# Patient Record
Sex: MALE | Race: WHITE | NOT HISPANIC OR LATINO
[De-identification: names, ages, dates, MRNs, and addresses within clinical notes are randomized per-mention and may not be internally consistent; named-entity substitution may affect disease eponyms.]

---

## 2019-04-30 ENCOUNTER — APPOINTMENT (OUTPATIENT)
Dept: PULMONOLOGY | Facility: CLINIC | Age: 69
End: 2019-04-30
Payer: MEDICARE

## 2019-04-30 VITALS
HEIGHT: 63 IN | OXYGEN SATURATION: 88 % | SYSTOLIC BLOOD PRESSURE: 105 MMHG | WEIGHT: 215 LBS | BODY MASS INDEX: 38.09 KG/M2 | RESPIRATION RATE: 16 BRPM | TEMPERATURE: 98 F | DIASTOLIC BLOOD PRESSURE: 61 MMHG | HEART RATE: 68 BPM

## 2019-04-30 PROCEDURE — 94729 DIFFUSING CAPACITY: CPT

## 2019-04-30 PROCEDURE — 94726 PLETHYSMOGRAPHY LUNG VOLUMES: CPT

## 2019-04-30 PROCEDURE — ZZZZZ: CPT

## 2019-04-30 PROCEDURE — 94060 EVALUATION OF WHEEZING: CPT

## 2019-04-30 PROCEDURE — 99214 OFFICE O/P EST MOD 30 MIN: CPT | Mod: 25

## 2019-04-30 NOTE — REVIEW OF SYSTEMS
[Snoring] : snoring [Dyspnea] : dyspnea [Nonrestorative Sleep] : nonrestorative sleep [Witnessed Apneas] : demonstrated ~M apnea [Awakes With Headache] : awakes with a headache [Awakes With Dry Mouth] : awakes with dry mouth [Negative] : Pulmonary Hypertension

## 2019-04-30 NOTE — REASON FOR VISIT
[Follow-Up - From Hospitalization] : a hospitalization follow-up [COPD] : COPD [Cough] : cough [Shortness of Breath] : shortness of Breath [Sleep Apnea] : sleep apnea [Wheezing] : wheezing [Spouse] : spouse

## 2019-04-30 NOTE — PHYSICAL EXAM
[General Appearance - Well Developed] : well developed [Normal Appearance] : normal appearance [Well Groomed] : well groomed [General Appearance - Well Nourished] : well nourished [Normal Conjunctiva] : the conjunctiva exhibited no abnormalities [General Appearance - In No Acute Distress] : no acute distress [No Deformities] : no deformities [Normal Oropharynx] : normal oropharynx [Eyelids - No Xanthelasma] : the eyelids demonstrated no xanthelasmas [III] : III [Neck Appearance] : the appearance of the neck was normal [Neck Cervical Mass (___cm)] : no neck mass was observed [Jugular Venous Distention Increased] : there was no jugular-venous distention [Heart Sounds] : normal S1 and S2 [Murmurs] : no murmurs present [Scattered Wheezes] : scattered wheezing [Exaggerated Use Of Accessory Muscles For Inspiration] : no accessory muscle use [Bibasilar Rales/Crackles] : bibasilar rales [Decreased Breath Sounds] : decreased breath sounds [Kyphosis] : kyphosis [Bowel Sounds] : normal bowel sounds [Gait - Sufficient For Exercise Testing] : the gait was sufficient for exercise testing [Abdomen Soft] : soft [Nail Clubbing] : no clubbing of the fingernails [Cyanosis, Localized] : no localized cyanosis [2+ Pitting] : 2+  pitting [] : no rash [Oriented To Time, Place, And Person] : oriented to person, place, and time [No Focal Deficits] : no focal deficits [Affect] : the affect was normal [Impaired Insight] : insight and judgment were intact [Mood] : the mood was normal

## 2019-04-30 NOTE — HISTORY OF PRESENT ILLNESS
[FreeTextEntry1] : Patient is a 68-year-old morbidly obese male with past medical history significant for chronic obstructive pulmonary disease, hypertension, hypothyroidism who presents today for followup after being discharged from the hospital. The patient complains of nonrestorative sleep and awakening with morning headaches. He denies fevers chills chest pain weight loss or hemoptysis

## 2019-04-30 NOTE — ASSESSMENT
[FreeTextEntry1] : In summary the patient is a 68-year-old morbidly obese male with past medical history significant for hypertension, hypothyroidism who was recently discharged from the hospital and presents today for followup. Patient's physical exam is significant for decreased air entry bilaterally.\par \par Patient underwent a pulmonary function test which revealed restrictive and obstructive lung disease.\par \par A  home sleep study has been ordered and the patient is instructed to followup in 6 weeks

## 2019-06-11 ENCOUNTER — APPOINTMENT (OUTPATIENT)
Dept: PULMONOLOGY | Facility: CLINIC | Age: 69
End: 2019-06-11
Payer: MEDICARE

## 2019-06-11 VITALS
RESPIRATION RATE: 14 BRPM | TEMPERATURE: 98.3 F | DIASTOLIC BLOOD PRESSURE: 66 MMHG | BODY MASS INDEX: 38.09 KG/M2 | OXYGEN SATURATION: 92 % | SYSTOLIC BLOOD PRESSURE: 109 MMHG | WEIGHT: 215 LBS | HEART RATE: 79 BPM | HEIGHT: 63 IN

## 2019-06-11 PROCEDURE — ZZZZZ: CPT

## 2019-06-11 PROCEDURE — 99214 OFFICE O/P EST MOD 30 MIN: CPT | Mod: 25

## 2019-06-11 PROCEDURE — 94060 EVALUATION OF WHEEZING: CPT

## 2019-06-11 PROCEDURE — 94729 DIFFUSING CAPACITY: CPT

## 2019-06-11 PROCEDURE — 94726 PLETHYSMOGRAPHY LUNG VOLUMES: CPT

## 2019-06-11 NOTE — HISTORY OF PRESENT ILLNESS
[FreeTextEntry1] : Patient is a 68-year-old morbidly obese male with past medical history significant for chronic obstructive pulmonary disease, hypertension, hypothyroidism who presents today for followup . Patient states that he feels better with less cough and shortness of breath\par States that she is wearing her BIPAP for 6 hours a night

## 2019-06-11 NOTE — PHYSICAL EXAM
[General Appearance - Well Developed] : well developed [Normal Appearance] : normal appearance [No Deformities] : no deformities [Well Groomed] : well groomed [General Appearance - Well Nourished] : well nourished [General Appearance - In No Acute Distress] : no acute distress [III] : III [Normal Conjunctiva] : the conjunctiva exhibited no abnormalities [Normal Oropharynx] : normal oropharynx [Eyelids - No Xanthelasma] : the eyelids demonstrated no xanthelasmas [Neck Appearance] : the appearance of the neck was normal [Neck Cervical Mass (___cm)] : no neck mass was observed [Heart Sounds] : normal S1 and S2 [Jugular Venous Distention Increased] : there was no jugular-venous distention [Murmurs] : no murmurs present [Exaggerated Use Of Accessory Muscles For Inspiration] : no accessory muscle use [Decreased Breath Sounds] : decreased breath sounds [Scattered Wheezes] : scattered wheezing [Bibasilar Rales/Crackles] : bibasilar rales [Kyphosis] : kyphosis [Gait - Sufficient For Exercise Testing] : the gait was sufficient for exercise testing [Abdomen Soft] : soft [Bowel Sounds] : normal bowel sounds [Cyanosis, Localized] : no localized cyanosis [Nail Clubbing] : no clubbing of the fingernails [] : no rash [2+ Pitting] : 2+  pitting [No Focal Deficits] : no focal deficits [Impaired Insight] : insight and judgment were intact [Oriented To Time, Place, And Person] : oriented to person, place, and time [Affect] : the affect was normal [Mood] : the mood was normal

## 2019-06-11 NOTE — REVIEW OF SYSTEMS
[Snoring] : snoring [Witnessed Apneas] : demonstrated ~M apnea [Dyspnea] : dyspnea [Nonrestorative Sleep] : nonrestorative sleep [Awakes With Headache] : awakes with a headache [Awakes With Dry Mouth] : awakes with dry mouth [Negative] : Pulmonary Hypertension

## 2019-06-11 NOTE — REASON FOR VISIT
[COPD] : COPD [Follow-Up] : a follow-up visit [Cough] : cough [Shortness of Breath] : shortness of Breath [Wheezing] : wheezing [Sleep Apnea] : sleep apnea [Spouse] : spouse

## 2019-06-11 NOTE — ASSESSMENT
[FreeTextEntry1] : In summary the patient is a 68-year-old morbidly obese male with past medical history significant for hypertension, hypothyroidism who  presents today for followup. Patient's physical exam is significant for decreased air entry bilaterally.\par \par Patient underwent a pulmonary function test which revealed restrictive and obstructive lung disease.\par PAtient is instructed to continue current medications and to follow up in 3 months

## 2019-10-29 ENCOUNTER — NON-APPOINTMENT (OUTPATIENT)
Age: 69
End: 2019-10-29

## 2019-10-29 ENCOUNTER — APPOINTMENT (OUTPATIENT)
Dept: PULMONOLOGY | Facility: CLINIC | Age: 69
End: 2019-10-29
Payer: MEDICARE

## 2019-10-29 VITALS
OXYGEN SATURATION: 93 % | HEIGHT: 63 IN | BODY MASS INDEX: 39.51 KG/M2 | SYSTOLIC BLOOD PRESSURE: 135 MMHG | DIASTOLIC BLOOD PRESSURE: 81 MMHG | HEART RATE: 68 BPM | RESPIRATION RATE: 14 BRPM | WEIGHT: 223 LBS | TEMPERATURE: 98.3 F

## 2019-10-29 PROCEDURE — 94060 EVALUATION OF WHEEZING: CPT

## 2019-10-29 PROCEDURE — 99214 OFFICE O/P EST MOD 30 MIN: CPT | Mod: 25

## 2019-10-31 NOTE — REVIEW OF SYSTEMS
[Dyspnea] : dyspnea [Snoring] : snoring [Witnessed Apneas] : demonstrated ~M apnea [Nonrestorative Sleep] : nonrestorative sleep [Awakes With Headache] : awakes with a headache [Awakes With Dry Mouth] : awakes with dry mouth [Negative] : Pulmonary Hypertension

## 2019-10-31 NOTE — PHYSICAL EXAM
[General Appearance - Well Developed] : well developed [Normal Appearance] : normal appearance [Well Groomed] : well groomed [General Appearance - Well Nourished] : well nourished [No Deformities] : no deformities [General Appearance - In No Acute Distress] : no acute distress [Normal Conjunctiva] : the conjunctiva exhibited no abnormalities [Eyelids - No Xanthelasma] : the eyelids demonstrated no xanthelasmas [Normal Oropharynx] : normal oropharynx [III] : III [Neck Appearance] : the appearance of the neck was normal [Neck Cervical Mass (___cm)] : no neck mass was observed [Jugular Venous Distention Increased] : there was no jugular-venous distention [Heart Sounds] : normal S1 and S2 [Murmurs] : no murmurs present [Exaggerated Use Of Accessory Muscles For Inspiration] : no accessory muscle use [Scattered Wheezes] : scattered wheezing [Decreased Breath Sounds] : decreased breath sounds [Bibasilar Rales/Crackles] : bibasilar rales [Kyphosis] : kyphosis [Bowel Sounds] : normal bowel sounds [Abdomen Soft] : soft [Gait - Sufficient For Exercise Testing] : the gait was sufficient for exercise testing [Nail Clubbing] : no clubbing of the fingernails [Cyanosis, Localized] : no localized cyanosis [2+ Pitting] : 2+  pitting [] : no rash [No Focal Deficits] : no focal deficits [Oriented To Time, Place, And Person] : oriented to person, place, and time [Impaired Insight] : insight and judgment were intact [Affect] : the affect was normal [Mood] : the mood was normal

## 2019-10-31 NOTE — REASON FOR VISIT
[Follow-Up] : a follow-up visit [COPD] : COPD [Shortness of Breath] : shortness of Breath [Sleep Apnea] : sleep apnea [Spouse] : spouse

## 2019-11-07 ENCOUNTER — APPOINTMENT (OUTPATIENT)
Dept: HEART AND VASCULAR | Facility: CLINIC | Age: 69
End: 2019-11-07
Payer: MEDICARE

## 2019-11-07 PROCEDURE — 93306 TTE W/DOPPLER COMPLETE: CPT

## 2019-11-11 NOTE — ASSESSMENT
[FreeTextEntry1] : In summary the patient is a 69-year-old morbidly obese male with past medical history significant for hypertension, hypothyroidism who  presents today for followup. Patient's physical exam is significant for decreased air entry bilaterally.\par \par Patient underwent spirometry which revealed obstructive lung disease.\par Patient advised to consult with cardiology due to HTN and SOB.\par Patient is instructed to continue current medications and to follow up in 3 months

## 2019-11-11 NOTE — HISTORY OF PRESENT ILLNESS
[FreeTextEntry1] : Patient is a 69-year-old morbidly obese male with past medical history significant for chronic obstructive pulmonary disease, hypertension, hypothyroidism who presents today for followup . Patient states that he feels better with less cough and shortness of breath\par States that he is wearing his  BIPAP for 6 hours a night

## 2019-11-19 ENCOUNTER — APPOINTMENT (OUTPATIENT)
Dept: HEART AND VASCULAR | Facility: CLINIC | Age: 69
End: 2019-11-19
Payer: MEDICARE

## 2019-11-19 ENCOUNTER — NON-APPOINTMENT (OUTPATIENT)
Age: 69
End: 2019-11-19

## 2019-11-19 VITALS
OXYGEN SATURATION: 91 % | HEART RATE: 74 BPM | DIASTOLIC BLOOD PRESSURE: 76 MMHG | BODY MASS INDEX: 39.51 KG/M2 | RESPIRATION RATE: 14 BRPM | WEIGHT: 223 LBS | TEMPERATURE: 97.8 F | HEIGHT: 63 IN | SYSTOLIC BLOOD PRESSURE: 119 MMHG

## 2019-11-19 PROCEDURE — 99205 OFFICE O/P NEW HI 60 MIN: CPT

## 2019-11-19 RX ORDER — FUROSEMIDE 40 MG/1
40 TABLET ORAL
Qty: 3 | Refills: 0 | Status: DISCONTINUED | COMMUNITY
Start: 2019-04-03 | End: 2019-11-19

## 2019-11-19 RX ORDER — FUROSEMIDE 20 MG/1
20 TABLET ORAL
Qty: 30 | Refills: 0 | Status: DISCONTINUED | COMMUNITY
Start: 2019-03-01 | End: 2019-11-19

## 2019-11-19 RX ORDER — AMLODIPINE BESYLATE 10 MG/1
10 TABLET ORAL
Qty: 30 | Refills: 0 | Status: DISCONTINUED | COMMUNITY
Start: 2019-04-03 | End: 2019-11-19

## 2019-11-19 NOTE — ASSESSMENT
[FreeTextEntry1] : 70 yo M with PMH COPD on home O2, LUKE on BIPAP, obesity, hypothyroidism, HTN here for first evaluation with this provider. \par \par DYSPNEA ON EXERTION\par -The patient reports a long history of dyspnea on exertion which is likely multifactorial and includes COPD and LUKE.\par -In the setting of  reported episodes of chest pain and rsik factors  will rule out for CAD with a nuclear stress test (the patient has limited exercise tolerance and walks with a cane) \par \par CHEST PAIN\par -As above\par -In the setting of chest pain, risk factors, we'll rule out for CAD with a nuclear stress test\par \par HTN \par -Well controlled\par -Continue amlodipine 5 mg daily\par - reccomnedd healthy of diet and exercise\par -Obtain a labs are from her primary M.D.\par \par HLD\par -Obtain labs from PMD\par \par Followup in 3 weeks to evaluate results of nuclear stress test\par

## 2019-11-19 NOTE — HISTORY OF PRESENT ILLNESS
[FreeTextEntry1] : PMD: Alex Mosley\par \par 70 yo M with PMH COPD on home O2, LUKE on BIPAP, obesity, hypothyroidism, HTN here for first evaluation with this provider. \par The patient reports severe GALLARDO, limiting his exercise tolerance. \par Today he is accompanied by his sister. \par He is able to walk with a cane for about 1 mile at a very slow pace. \par Reports chest pressure described as chest discomfort 3 times week, mostly during exercise and resolving with rest. \par Reports compliance with his medications and BIPAP\par He reports that his symptoms have been present for years but recently are getting more frequent and limiting his exercise tolerance. \par \par \par PMH\par none \par \par ALL\par NKDA\par \par MEDS\par amlodipine 5 mg daily \par tamsulosin\par levothyroxine\par \par FH \par mother with MI\par \par SH\par no smokes, social; etoh, no drugs \par \par EKG 11/19/2019:\par Normal sinus rhythm, within normal limits\par \par Echocardiogram 11/11/2019\par Technically suboptimal study due to the patient's habitus overall left ventricular ejection fraction is 60-65%. \par Mildly enlarged left atrium\par

## 2019-11-19 NOTE — PHYSICAL EXAM
[General Appearance - Well Developed] : well developed [Normal Appearance] : normal appearance [Well Groomed] : well groomed [General Appearance - Well Nourished] : well nourished [No Deformities] : no deformities [General Appearance - In No Acute Distress] : no acute distress [Normal Jugular Venous A Waves Present] : normal jugular venous A waves present [Normal Jugular Venous V Waves Present] : normal jugular venous V waves present [No Jugular Venous Orellana A Waves] : no jugular venous orellana A waves [Heart Rate And Rhythm] : heart rate and rhythm were normal [Heart Sounds] : normal S1 and S2 [Murmurs] : no murmurs present [Respiration, Rhythm And Depth] : normal respiratory rhythm and effort [Exaggerated Use Of Accessory Muscles For Inspiration] : no accessory muscle use [Auscultation Breath Sounds / Voice Sounds] : lungs were clear to auscultation bilaterally [Abdomen Soft] : soft [Abdomen Tenderness] : non-tender [Abdomen Mass (___ Cm)] : no abdominal mass palpated [Nail Clubbing] : no clubbing of the fingernails [Cyanosis, Localized] : no localized cyanosis [Petechial Hemorrhages (___cm)] : no petechial hemorrhages [] : no rash [Skin Color & Pigmentation] : normal skin color and pigmentation [No Venous Stasis] : no venous stasis [Skin Lesions] : no skin lesions [No Skin Ulcers] : no skin ulcer [No Xanthoma] : no  xanthoma was observed [Oriented To Time, Place, And Person] : oriented to person, place, and time [Affect] : the affect was normal [No Anxiety] : not feeling anxious [Mood] : the mood was normal [FreeTextEntry1] : obese

## 2019-12-13 ENCOUNTER — OUTPATIENT (OUTPATIENT)
Dept: OUTPATIENT SERVICES | Facility: HOSPITAL | Age: 69
LOS: 1 days | End: 2019-12-13
Payer: COMMERCIAL

## 2019-12-13 DIAGNOSIS — I20.9 ANGINA PECTORIS, UNSPECIFIED: ICD-10-CM

## 2019-12-13 PROCEDURE — A9505: CPT

## 2019-12-13 PROCEDURE — 93018 CV STRESS TEST I&R ONLY: CPT

## 2019-12-13 PROCEDURE — 93016 CV STRESS TEST SUPVJ ONLY: CPT

## 2019-12-13 PROCEDURE — 93017 CV STRESS TEST TRACING ONLY: CPT

## 2019-12-13 PROCEDURE — A9500: CPT

## 2019-12-13 PROCEDURE — 78452 HT MUSCLE IMAGE SPECT MULT: CPT

## 2019-12-13 PROCEDURE — 78452 HT MUSCLE IMAGE SPECT MULT: CPT | Mod: 26

## 2019-12-19 ENCOUNTER — APPOINTMENT (OUTPATIENT)
Dept: HEART AND VASCULAR | Facility: CLINIC | Age: 69
End: 2019-12-19
Payer: MEDICARE

## 2019-12-19 VITALS
SYSTOLIC BLOOD PRESSURE: 127 MMHG | HEART RATE: 71 BPM | HEIGHT: 63 IN | OXYGEN SATURATION: 92 % | WEIGHT: 228 LBS | DIASTOLIC BLOOD PRESSURE: 79 MMHG | BODY MASS INDEX: 40.4 KG/M2 | RESPIRATION RATE: 12 BRPM

## 2019-12-19 PROCEDURE — 99215 OFFICE O/P EST HI 40 MIN: CPT

## 2019-12-19 NOTE — HISTORY OF PRESENT ILLNESS
[FreeTextEntry1] : PMD: Alex Mosley\par \par 68 yo M with PMH COPD on home O2, LUKE on BIPAP, obesity, hypothyroidism, HTN here a f/u. \par The patient reports severe GALLARDO, limiting his exercise tolerance. \par Today he is accompanied by his sister. \par He is able to walk with a cane for about 1 mile at a very slow pace. \par Reports chest pressure described as chest discomfort a few times , mostly during exercise and resolving with rest. \par Reports compliance with his medications and BIPAP\par He reports that his symptoms have been present for years but recently are getting more frequent and limiting his exercise tolerance. \par \par \par PMH\par none \par \par ALL\par NKDA\par \par MEDS\par amlodipine 5 mg daily \par tamsulosin\par levothyroxine\par \par FH \par mother with MI\par \par SH\par no smokes, social; etoh, no drugs \par \par EKG 11/19/2019:\par Normal sinus rhythm, within normal limits\par \par Echocardiogram 11/11/2019\par Technically suboptimal study due to the patient's habitus overall left ventricular ejection fraction is 60-65%. \par Mildly enlarged left atrium\par \par Nuclear Stress 12/13/2019\par Normal myocardial perfusion\par Left ventricular function is normal  with normal wall motion\par Normal EKG stress\par

## 2019-12-19 NOTE — PHYSICAL EXAM
[General Appearance - Well Developed] : well developed [Normal Appearance] : normal appearance [Well Groomed] : well groomed [General Appearance - Well Nourished] : well nourished [No Deformities] : no deformities [General Appearance - In No Acute Distress] : no acute distress [Normal Jugular Venous A Waves Present] : normal jugular venous A waves present [Normal Jugular Venous V Waves Present] : normal jugular venous V waves present [No Jugular Venous Orellana A Waves] : no jugular venous orellana A waves [Exaggerated Use Of Accessory Muscles For Inspiration] : no accessory muscle use [Heart Rate And Rhythm] : heart rate and rhythm were normal [Heart Sounds] : normal S1 and S2 [Murmurs] : no murmurs present [Abdomen Soft] : soft [Abdomen Tenderness] : non-tender [Abdomen Mass (___ Cm)] : no abdominal mass palpated [Nail Clubbing] : no clubbing of the fingernails [Cyanosis, Localized] : no localized cyanosis [Petechial Hemorrhages (___cm)] : no petechial hemorrhages [Skin Color & Pigmentation] : normal skin color and pigmentation [] : no rash [No Venous Stasis] : no venous stasis [Skin Lesions] : no skin lesions [No Skin Ulcers] : no skin ulcer [No Xanthoma] : no  xanthoma was observed [Oriented To Time, Place, And Person] : oriented to person, place, and time [Affect] : the affect was normal [Mood] : the mood was normal [No Anxiety] : not feeling anxious [FreeTextEntry1] : Scattered rhonchi at b/l bases. Diminished BS in RUL

## 2020-01-14 ENCOUNTER — APPOINTMENT (OUTPATIENT)
Dept: PULMONOLOGY | Facility: CLINIC | Age: 70
End: 2020-01-14

## 2020-01-28 ENCOUNTER — APPOINTMENT (OUTPATIENT)
Dept: PULMONOLOGY | Facility: CLINIC | Age: 70
End: 2020-01-28
Payer: MEDICARE

## 2020-01-28 VITALS
HEART RATE: 70 BPM | WEIGHT: 215 LBS | OXYGEN SATURATION: 86 % | DIASTOLIC BLOOD PRESSURE: 75 MMHG | RESPIRATION RATE: 14 BRPM | SYSTOLIC BLOOD PRESSURE: 125 MMHG | HEIGHT: 63 IN | BODY MASS INDEX: 38.09 KG/M2

## 2020-01-28 PROCEDURE — 99214 OFFICE O/P EST MOD 30 MIN: CPT

## 2020-01-28 NOTE — PHYSICAL EXAM
[No Acute Distress] : no acute distress [Well Nourished] : well nourished [Well Groomed] : well groomed [No Deformities] : no deformities [Well Developed] : well developed [Normal Oropharynx] : normal oropharynx [III] : Mallampati Class: III [Normal Appearance] : normal appearance [Supple] : supple [No Neck Mass] : no neck mass [No JVD] : no jvd [Normal Rate/Rhythm] : normal rate/rhythm [Normal S1, S2] : normal s1, s2 [No Murmurs] : no murmurs [No Resp Distress] : no resp distress [No Acc Muscle Use] : no acc muscle use [Clear to Auscultation Bilaterally] : clear to auscultation bilaterally [No Abnormalities] : no abnormalities [Benign] : benign [Not Tender] : not tender [Normal Gait] : normal gait [No Clubbing] : no clubbing [No Cyanosis] : no cyanosis [No Edema] : no edema [FROM] : FROM [Normal Color/ Pigmentation] : normal color/ pigmentation [No Focal Deficits] : no focal deficits [Oriented x3] : oriented x3 [Normal Affect] : normal affect

## 2020-01-28 NOTE — HISTORY OF PRESENT ILLNESS
[Former] : former [Never] : never [TextBox_4] : Patient is a 69-year-old obese male with past medical history significant for chronic obstructive pulmonary disease, hypertension, hypothyroidism, obstructive sleep apnea currently on CPAP who presents for followup. The patient had a recent stress test. He states that he is compliant with his CPAP at least 4 hours a day

## 2020-01-28 NOTE — ASSESSMENT
[FreeTextEntry1] : Summary the patient is a 69-year-old obese male with past medical history significant for hypertension, hypothyroidism, obstructive sleep apnea currently on CPAP who presents for followup. The patient still complains of shortness of breath and dyspnea on exertion although his physical exam is significant for good air entry bilaterally. A cardiopulmonary stress test has been ordered and the patient is instructed to followup in 2 weeks

## 2020-01-28 NOTE — REASON FOR VISIT
[Follow-Up] : a follow-up visit [Sleep Apnea] : sleep apnea [Shortness of Breath] : shortness of breath [Friend] : friend

## 2020-02-11 ENCOUNTER — APPOINTMENT (OUTPATIENT)
Dept: INTERNAL MEDICINE | Facility: CLINIC | Age: 70
End: 2020-02-11
Payer: MEDICARE

## 2020-02-11 VITALS
BODY MASS INDEX: 38.62 KG/M2 | RESPIRATION RATE: 15 BRPM | WEIGHT: 218 LBS | DIASTOLIC BLOOD PRESSURE: 72 MMHG | TEMPERATURE: 97.3 F | OXYGEN SATURATION: 94 % | HEART RATE: 62 BPM | HEIGHT: 63 IN | SYSTOLIC BLOOD PRESSURE: 122 MMHG

## 2020-02-11 PROCEDURE — 99203 OFFICE O/P NEW LOW 30 MIN: CPT | Mod: 25

## 2020-02-11 PROCEDURE — 36415 COLL VENOUS BLD VENIPUNCTURE: CPT

## 2020-02-12 NOTE — HEALTH RISK ASSESSMENT
[Good] : ~his/her~  mood as  good [No] : In the past 12 months have you used drugs other than those required for medical reasons? No [0] : 1) Little interest or pleasure doing things: Not at all (0) [No falls in past year] : Patient reported no falls in the past year [] : No

## 2020-02-12 NOTE — PHYSICAL EXAM
[No Acute Distress] : no acute distress [Well Nourished] : well nourished [Well Developed] : well developed [Normal Sclera/Conjunctiva] : normal sclera/conjunctiva [Well-Appearing] : well-appearing [Normal Outer Ear/Nose] : the outer ears and nose were normal in appearance [PERRL] : pupils equal round and reactive to light [EOMI] : extraocular movements intact [Normal Oropharynx] : the oropharynx was normal [No JVD] : no jugular venous distention [Supple] : supple [No Lymphadenopathy] : no lymphadenopathy [Thyroid Normal, No Nodules] : the thyroid was normal and there were no nodules present [No Respiratory Distress] : no respiratory distress  [No Accessory Muscle Use] : no accessory muscle use [Clear to Auscultation] : lungs were clear to auscultation bilaterally [Regular Rhythm] : with a regular rhythm [Normal Rate] : normal rate  [No Murmur] : no murmur heard [Normal S1, S2] : normal S1 and S2 [No Carotid Bruits] : no carotid bruits [No Abdominal Bruit] : a ~M bruit was not heard ~T in the abdomen [Pedal Pulses Present] : the pedal pulses are present [No Edema] : there was no peripheral edema [No Varicosities] : no varicosities [No Extremity Clubbing/Cyanosis] : no extremity clubbing/cyanosis [No Palpable Aorta] : no palpable aorta [Non Tender] : non-tender [Non-distended] : non-distended [Soft] : abdomen soft [No HSM] : no HSM [No Masses] : no abdominal mass palpated [Normal Bowel Sounds] : normal bowel sounds [Normal Posterior Cervical Nodes] : no posterior cervical lymphadenopathy [No CVA Tenderness] : no CVA  tenderness [Normal Anterior Cervical Nodes] : no anterior cervical lymphadenopathy [No Spinal Tenderness] : no spinal tenderness [No Joint Swelling] : no joint swelling [Grossly Normal Strength/Tone] : grossly normal strength/tone [No Rash] : no rash [Coordination Grossly Intact] : coordination grossly intact [No Focal Deficits] : no focal deficits [Normal Gait] : normal gait [Deep Tendon Reflexes (DTR)] : deep tendon reflexes were 2+ and symmetric [Normal Affect] : the affect was normal [Normal Insight/Judgement] : insight and judgment were intact [FreeTextEntry1] : deferred

## 2020-02-12 NOTE — END OF VISIT
[FreeTextEntry3] : I, Shelbie Alvarez, personally transcribed these services in the presence of Dr. Saeed Goodman MD. I, Dr. Saeed Goodman MD, personally performed the services described in this documentation as scribed by Shelbie Alvarez in my presence and it is both accurate and complete.\par

## 2020-02-12 NOTE — HISTORY OF PRESENT ILLNESS
[FreeTextEntry1] : physical examination\par  [de-identified] : Eh is a 69 year old male who presents to the office for a physical examination. Patient is feeling well and offers no specific complaints.

## 2020-02-13 ENCOUNTER — APPOINTMENT (OUTPATIENT)
Dept: PULMONOLOGY | Facility: CLINIC | Age: 70
End: 2020-02-13
Payer: MEDICARE

## 2020-02-13 ENCOUNTER — NON-APPOINTMENT (OUTPATIENT)
Age: 70
End: 2020-02-13

## 2020-02-13 PROCEDURE — 94060 EVALUATION OF WHEEZING: CPT | Mod: 26

## 2020-02-14 ENCOUNTER — NON-APPOINTMENT (OUTPATIENT)
Age: 70
End: 2020-02-14

## 2020-02-14 PROCEDURE — 94621 CARDIOPULM EXERCISE TESTING: CPT

## 2020-02-16 LAB
ALBUMIN SERPL ELPH-MCNC: 4.7 G/DL
ALP BLD-CCNC: 82 U/L
ALT SERPL-CCNC: 15 U/L
ANION GAP SERPL CALC-SCNC: 13 MMOL/L
APPEARANCE: CLEAR
AST SERPL-CCNC: 20 U/L
BACTERIA: NEGATIVE
BILIRUB SERPL-MCNC: 0.3 MG/DL
BILIRUBIN URINE: NEGATIVE
BLOOD URINE: NEGATIVE
BUN SERPL-MCNC: 22 MG/DL
CALCIUM SERPL-MCNC: 9.9 MG/DL
CHLORIDE SERPL-SCNC: 103 MMOL/L
CHOLEST SERPL-MCNC: 150 MG/DL
CHOLEST/HDLC SERPL: 3.1 RATIO
CO2 SERPL-SCNC: 27 MMOL/L
COLOR: YELLOW
CREAT SERPL-MCNC: 0.93 MG/DL
GLUCOSE QUALITATIVE U: NEGATIVE
GLUCOSE SERPL-MCNC: 113 MG/DL
HDLC SERPL-MCNC: 49 MG/DL
HYALINE CASTS: 0 /LPF
KETONES URINE: NEGATIVE
LDLC SERPL CALC-MCNC: 78 MG/DL
LEUKOCYTE ESTERASE URINE: NEGATIVE
MICROSCOPIC-UA: NORMAL
NITRITE URINE: NEGATIVE
PH URINE: 5.5
POTASSIUM SERPL-SCNC: 4.7 MMOL/L
PROT SERPL-MCNC: 6.8 G/DL
PROTEIN URINE: NORMAL
PSA SERPL-MCNC: 4.97 NG/ML
RED BLOOD CELLS URINE: 2 /HPF
SODIUM SERPL-SCNC: 143 MMOL/L
SPECIFIC GRAVITY URINE: 1.03
SQUAMOUS EPITHELIAL CELLS: 0 /HPF
T3 SERPL-MCNC: 98 NG/DL
T4 FREE SERPL-MCNC: 1.3 NG/DL
T4 SERPL-MCNC: 7.4 UG/DL
TRIGL SERPL-MCNC: 113 MG/DL
TSH SERPL-ACNC: 2.6 UIU/ML
UROBILINOGEN URINE: NORMAL
WHITE BLOOD CELLS URINE: 1 /HPF

## 2020-02-19 LAB
BASOPHILS # BLD AUTO: 0.06 K/UL
BASOPHILS NFR BLD AUTO: 0.9 %
EOSINOPHIL # BLD AUTO: 0.16 K/UL
EOSINOPHIL NFR BLD AUTO: 2.3 %
HCT VFR BLD CALC: 42.4 %
HGB BLD-MCNC: 13.7 G/DL
IMM GRANULOCYTES NFR BLD AUTO: 0.4 %
LYMPHOCYTES # BLD AUTO: 1.73 K/UL
LYMPHOCYTES NFR BLD AUTO: 24.7 %
MAN DIFF?: NORMAL
MCHC RBC-ENTMCNC: 31.1 PG
MCHC RBC-ENTMCNC: 32.3 GM/DL
MCV RBC AUTO: 96.1 FL
MONOCYTES # BLD AUTO: 0.65 K/UL
MONOCYTES NFR BLD AUTO: 9.3 %
NEUTROPHILS # BLD AUTO: 4.36 K/UL
NEUTROPHILS NFR BLD AUTO: 62.4 %
PLATELET # BLD AUTO: 215 K/UL
RBC # BLD: 4.41 M/UL
RBC # FLD: 12.6 %
WBC # FLD AUTO: 6.99 K/UL

## 2020-04-21 ENCOUNTER — APPOINTMENT (OUTPATIENT)
Dept: PULMONOLOGY | Facility: CLINIC | Age: 70
End: 2020-04-21
Payer: MEDICARE

## 2020-04-21 ENCOUNTER — APPOINTMENT (OUTPATIENT)
Dept: INTERNAL MEDICINE | Facility: CLINIC | Age: 70
End: 2020-04-21
Payer: MEDICARE

## 2020-04-21 VITALS
WEIGHT: 210 LBS | RESPIRATION RATE: 15 BRPM | HEIGHT: 63 IN | OXYGEN SATURATION: 94 % | BODY MASS INDEX: 37.21 KG/M2 | DIASTOLIC BLOOD PRESSURE: 75 MMHG | TEMPERATURE: 97.6 F | HEART RATE: 71 BPM | SYSTOLIC BLOOD PRESSURE: 120 MMHG

## 2020-04-21 PROCEDURE — 99214 OFFICE O/P EST MOD 30 MIN: CPT | Mod: 25

## 2020-04-21 PROCEDURE — 36415 COLL VENOUS BLD VENIPUNCTURE: CPT

## 2020-04-21 PROCEDURE — 99214 OFFICE O/P EST MOD 30 MIN: CPT

## 2020-04-21 NOTE — PHYSICAL EXAM
[No Acute Distress] : no acute distress [Well Nourished] : well nourished [Well Developed] : well developed [Well-Appearing] : well-appearing [Normal Sclera/Conjunctiva] : normal sclera/conjunctiva [PERRL] : pupils equal round and reactive to light [Normal Outer Ear/Nose] : the outer ears and nose were normal in appearance [EOMI] : extraocular movements intact [Normal Oropharynx] : the oropharynx was normal [No Lymphadenopathy] : no lymphadenopathy [No JVD] : no jugular venous distention [Supple] : supple [Thyroid Normal, No Nodules] : the thyroid was normal and there were no nodules present [No Respiratory Distress] : no respiratory distress  [Clear to Auscultation] : lungs were clear to auscultation bilaterally [No Accessory Muscle Use] : no accessory muscle use [Regular Rhythm] : with a regular rhythm [Normal Rate] : normal rate  [Normal S1, S2] : normal S1 and S2 [No Murmur] : no murmur heard [No Carotid Bruits] : no carotid bruits [No Abdominal Bruit] : a ~M bruit was not heard ~T in the abdomen [No Varicosities] : no varicosities [Pedal Pulses Present] : the pedal pulses are present [No Edema] : there was no peripheral edema [No Palpable Aorta] : no palpable aorta [No Extremity Clubbing/Cyanosis] : no extremity clubbing/cyanosis [Soft] : abdomen soft [Non Tender] : non-tender [No Masses] : no abdominal mass palpated [Non-distended] : non-distended [Normal Bowel Sounds] : normal bowel sounds [No HSM] : no HSM [Normal Posterior Cervical Nodes] : no posterior cervical lymphadenopathy [Normal Anterior Cervical Nodes] : no anterior cervical lymphadenopathy [No CVA Tenderness] : no CVA  tenderness [No Spinal Tenderness] : no spinal tenderness [No Joint Swelling] : no joint swelling [Grossly Normal Strength/Tone] : grossly normal strength/tone [No Rash] : no rash [No Focal Deficits] : no focal deficits [Coordination Grossly Intact] : coordination grossly intact [Normal Affect] : the affect was normal [Deep Tendon Reflexes (DTR)] : deep tendon reflexes were 2+ and symmetric [Normal Gait] : normal gait [FreeTextEntry1] : DEFERRED [Normal Insight/Judgement] : insight and judgment were intact

## 2020-04-21 NOTE — HEALTH RISK ASSESSMENT
[No falls in past year] : Patient reported no falls in the past year [No] : In the past 12 months have you used drugs other than those required for medical reasons? No [0] : 1) Little interest or pleasure doing things: Not at all (0) [[id number]] : 0 [Audit-CScore] : 1

## 2020-04-21 NOTE — HISTORY OF PRESENT ILLNESS
[FreeTextEntry1] :  f/ul elevated glucose and PSA [de-identified] : 68 yo wm came to the office for f/u .found to have   mildly elevated glucose and PSA.\par PT STATES THAT WHEN HE URINATES HE DOES NOT FEEL THAT HIS BLADDER IS EMPTYING COMPLETELY

## 2020-04-22 VITALS — SYSTOLIC BLOOD PRESSURE: 120 MMHG | DIASTOLIC BLOOD PRESSURE: 70 MMHG

## 2020-04-22 NOTE — HISTORY OF PRESENT ILLNESS
[Former] : former [Never] : never [TextBox_4] : Patient is a 69-year-old male with past medical history significant for chronic obstructive pulmonary disease, obstructive sleep apnea on CPAP who presents today for followup. Patient states that he is compliant with his CPAP. He denies fevers chest pain weight loss or hemoptysis

## 2020-04-22 NOTE — ASSESSMENT
[FreeTextEntry1] : Summary the patient is a 69-year-old obese male with past medical history significant for hypertension, hypothyroidism, obstructive sleep apnea currently on CPAP who presents for followup. The patient still complains of shortness of breath and dyspnea on exertion although his physical exam is significant for good air entry bilaterally.Prescription renewal performed . Patient instructed to continue current medications and followup in 3 months

## 2020-04-22 NOTE — PHYSICAL EXAM
[No Acute Distress] : no acute distress [Well Nourished] : well nourished [Well Groomed] : well groomed [No Deformities] : no deformities [Normal Appearance] : normal appearance [Normal Oropharynx] : normal oropharynx [Well Developed] : well developed [III] : Mallampati Class: III [No JVD] : no jvd [Supple] : supple [No Neck Mass] : no neck mass [No Murmurs] : no murmurs [Normal S1, S2] : normal s1, s2 [Normal Rate/Rhythm] : normal rate/rhythm [No Acc Muscle Use] : no acc muscle use [No Resp Distress] : no resp distress [Clear to Auscultation Bilaterally] : clear to auscultation bilaterally [No Abnormalities] : no abnormalities [Benign] : benign [Normal Gait] : normal gait [Not Tender] : not tender [No Clubbing] : no clubbing [FROM] : FROM [No Cyanosis] : no cyanosis [No Edema] : no edema [Normal Color/ Pigmentation] : normal color/ pigmentation [No Focal Deficits] : no focal deficits [Oriented x3] : oriented x3 [Normal Affect] : normal affect

## 2020-04-24 LAB
ESTIMATED AVERAGE GLUCOSE: 126 MG/DL
GLUCOSE BS SERPL-MCNC: 89 MG/DL
HBA1C MFR BLD HPLC: 6 %
PSA SERPL-MCNC: 5.65 NG/ML
T3 SERPL-MCNC: 105 NG/DL
T4 FREE SERPL-MCNC: 1.2 NG/DL
T4 SERPL-MCNC: 7.4 UG/DL
TSH SERPL-ACNC: 5.16 UIU/ML

## 2020-04-28 ENCOUNTER — APPOINTMENT (OUTPATIENT)
Dept: INTERNAL MEDICINE | Facility: CLINIC | Age: 70
End: 2020-04-28

## 2020-05-12 ENCOUNTER — APPOINTMENT (OUTPATIENT)
Dept: HEART AND VASCULAR | Facility: CLINIC | Age: 70
End: 2020-05-12

## 2021-02-22 RX ORDER — LEVOFLOXACIN 500 MG/1
500 TABLET, FILM COATED ORAL
Qty: 7 | Refills: 0 | Status: COMPLETED | COMMUNITY
Start: 2019-03-01 | End: 2021-02-22

## 2021-02-22 RX ORDER — PREDNISONE 10 MG/1
10 TABLET ORAL
Qty: 2 | Refills: 0 | Status: COMPLETED | COMMUNITY
Start: 2019-04-03 | End: 2021-02-22

## 2021-02-22 RX ORDER — ALBUTEROL SULFATE 90 UG/1
108 (90 BASE) INHALANT RESPIRATORY (INHALATION)
Qty: 8 | Refills: 0 | Status: COMPLETED | COMMUNITY
Start: 2019-02-22 | End: 2021-02-22

## 2021-02-22 RX ORDER — PANTOPRAZOLE 40 MG/1
40 TABLET, DELAYED RELEASE ORAL
Qty: 90 | Refills: 0 | Status: COMPLETED | COMMUNITY
Start: 2019-04-24 | End: 2021-02-22

## 2021-02-22 RX ORDER — AMLODIPINE BESYLATE 5 MG/1
5 TABLET ORAL
Qty: 30 | Refills: 0 | Status: COMPLETED | COMMUNITY
Start: 2019-04-18 | End: 2021-02-22

## 2021-02-22 RX ORDER — PREDNISONE 20 MG/1
20 TABLET ORAL
Qty: 2 | Refills: 0 | Status: COMPLETED | COMMUNITY
Start: 2019-04-03 | End: 2021-02-22

## 2021-02-22 RX ORDER — IPRATROPIUM BROMIDE AND ALBUTEROL SULFATE 2.5; .5 MG/3ML; MG/3ML
0.5-2.5 (3) SOLUTION RESPIRATORY (INHALATION)
Qty: 90 | Refills: 0 | Status: COMPLETED | COMMUNITY
Start: 2019-04-03 | End: 2021-02-22

## 2021-02-22 RX ORDER — CITALOPRAM HYDROBROMIDE 10 MG/1
10 TABLET, FILM COATED ORAL
Qty: 90 | Refills: 0 | Status: COMPLETED | COMMUNITY
Start: 2019-04-24 | End: 2021-02-22

## 2021-04-29 ENCOUNTER — APPOINTMENT (OUTPATIENT)
Dept: PULMONOLOGY | Facility: CLINIC | Age: 71
End: 2021-04-29
Payer: MEDICARE

## 2021-04-29 VITALS
OXYGEN SATURATION: 95 % | BODY MASS INDEX: 40.22 KG/M2 | TEMPERATURE: 98.4 F | RESPIRATION RATE: 14 BRPM | SYSTOLIC BLOOD PRESSURE: 131 MMHG | WEIGHT: 227 LBS | HEIGHT: 63 IN | HEART RATE: 78 BPM | DIASTOLIC BLOOD PRESSURE: 78 MMHG

## 2021-04-29 PROCEDURE — 94729 DIFFUSING CAPACITY: CPT

## 2021-04-29 PROCEDURE — 99214 OFFICE O/P EST MOD 30 MIN: CPT | Mod: 25

## 2021-04-29 PROCEDURE — 99072 ADDL SUPL MATRL&STAF TM PHE: CPT

## 2021-04-29 PROCEDURE — 94726 PLETHYSMOGRAPHY LUNG VOLUMES: CPT

## 2021-04-29 PROCEDURE — 94060 EVALUATION OF WHEEZING: CPT

## 2021-04-29 PROCEDURE — 95012 NITRIC OXIDE EXP GAS DETER: CPT

## 2021-04-29 NOTE — HISTORY OF PRESENT ILLNESS
[Former] : former [Never] : never [TextBox_4] : Patient is a 70 year old male with past medical history significant for obstructive sleep apnea currently on CPAP, hypertension, hypothyroidism who presents today for follow-up.  The patient has not been seen in over 1 year.  He also states that he is not compliant with his bronchodilator therapy.  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

## 2021-04-29 NOTE — PHYSICAL EXAM
[No Acute Distress] : no acute distress [Well Nourished] : well nourished [Well Groomed] : well groomed [No Deformities] : no deformities [Well Developed] : well developed [Normal Oropharynx] : normal oropharynx [III] : Mallampati Class: III [Normal Appearance] : normal appearance [Supple] : supple [No Neck Mass] : no neck mass [No JVD] : no jvd [Normal Rate/Rhythm] : normal rate/rhythm [Normal S1, S2] : normal s1, s2 [No Murmurs] : no murmurs [No Resp Distress] : no resp distress [No Acc Muscle Use] : no acc muscle use [Wheeze] : wheeze [No Abnormalities] : no abnormalities [Benign] : benign [Not Tender] : not tender [Normal Gait] : normal gait [No Clubbing] : no clubbing [No Cyanosis] : no cyanosis [No Edema] : no edema [FROM] : FROM [Normal Color/ Pigmentation] : normal color/ pigmentation [No Focal Deficits] : no focal deficits [Oriented x3] : oriented x3 [Normal Affect] : normal affect

## 2021-04-29 NOTE — ASSESSMENT
[FreeTextEntry1] : In summary the patient is a 70-year-old obese male with past medical history significant for obstructive sleep apnea currently on CPAP, hypertension, hypothyroidism who presents today for follow-up.\par \par Patient's physical exam is significant for expiratory wheezing bilaterally.\par \par Patient underwent a pulmonary function test which revealed severe obstructive and restrictive lung disease\par FeNO 8 PPB\par Had a lengthy discussion with the patient regarding CPAP compliance.  I will contact his [employer] company to reorder supplies.  Patient is now started on Spiriva and instructed to follow-up in 1 month

## 2021-05-27 ENCOUNTER — APPOINTMENT (OUTPATIENT)
Dept: PULMONOLOGY | Facility: CLINIC | Age: 71
End: 2021-05-27
Payer: MEDICARE

## 2021-05-27 VITALS
WEIGHT: 223 LBS | TEMPERATURE: 98.3 F | OXYGEN SATURATION: 93 % | HEIGHT: 63 IN | RESPIRATION RATE: 16 BRPM | BODY MASS INDEX: 39.51 KG/M2 | HEART RATE: 65 BPM | DIASTOLIC BLOOD PRESSURE: 72 MMHG | SYSTOLIC BLOOD PRESSURE: 118 MMHG

## 2021-05-27 PROCEDURE — 99214 OFFICE O/P EST MOD 30 MIN: CPT

## 2021-05-27 NOTE — ASSESSMENT
[FreeTextEntry1] : In summary the patient is a 70-year-old obese male with past medical history significant for obstructive sleep apnea currently on CPAP, hypertension, hypothyroidism who presents today for follow-up.\par The patient's physical exam is significant for good air entry bilaterally.  Prescription renewal performed.  Patient instructed to continue current therapy and follow-up in 3 months

## 2021-05-27 NOTE — HISTORY OF PRESENT ILLNESS
[Former] : former [Never] : never [TextBox_4] : Patient is a 70-year-old male with past medical history significant for obstructive sleep apnea currently on CPAP, hypothyroidism, hypertension who presents today for follow-up. The patient states that his cough shortness of breath and dyspnea on exertion has improved. He recently reported new supplies for his CPAP machine. He denies fevers chills chest pain weight loss or hemoptysis

## 2021-06-17 ENCOUNTER — RX RENEWAL (OUTPATIENT)
Age: 71
End: 2021-06-17

## 2021-07-08 ENCOUNTER — RX RENEWAL (OUTPATIENT)
Age: 71
End: 2021-07-08

## 2021-08-12 ENCOUNTER — RX RENEWAL (OUTPATIENT)
Age: 71
End: 2021-08-12

## 2021-08-24 ENCOUNTER — APPOINTMENT (OUTPATIENT)
Dept: PULMONOLOGY | Facility: CLINIC | Age: 71
End: 2021-08-24
Payer: MEDICARE

## 2021-08-24 VITALS
DIASTOLIC BLOOD PRESSURE: 73 MMHG | OXYGEN SATURATION: 94 % | RESPIRATION RATE: 16 BRPM | WEIGHT: 209 LBS | SYSTOLIC BLOOD PRESSURE: 123 MMHG | BODY MASS INDEX: 37.03 KG/M2 | HEART RATE: 68 BPM | TEMPERATURE: 98.6 F | HEIGHT: 63 IN

## 2021-08-24 PROCEDURE — 99214 OFFICE O/P EST MOD 30 MIN: CPT

## 2021-08-24 NOTE — HISTORY OF PRESENT ILLNESS
[TextBox_4] : Patient is a 71-year-old male with past medical history significant for obstructive sleep apnea currently on CPAP, hypothyroidism, hypertension, BPH who presents today for follow-up.  Patient states that he is compliant with his CPAP and uses it for minimum of 4 hours/day.  He denies any snoring and nonrestorative sleep.  He currently states he is compliant with his medications and his diet

## 2021-11-09 ENCOUNTER — APPOINTMENT (OUTPATIENT)
Dept: PULMONOLOGY | Facility: CLINIC | Age: 71
End: 2021-11-09
Payer: MEDICARE

## 2021-11-09 ENCOUNTER — NON-APPOINTMENT (OUTPATIENT)
Age: 71
End: 2021-11-09

## 2021-11-09 ENCOUNTER — APPOINTMENT (OUTPATIENT)
Dept: INTERNAL MEDICINE | Facility: CLINIC | Age: 71
End: 2021-11-09
Payer: MEDICARE

## 2021-11-09 VITALS — SYSTOLIC BLOOD PRESSURE: 130 MMHG | DIASTOLIC BLOOD PRESSURE: 80 MMHG

## 2021-11-09 VITALS
RESPIRATION RATE: 14 BRPM | BODY MASS INDEX: 36.5 KG/M2 | SYSTOLIC BLOOD PRESSURE: 128 MMHG | OXYGEN SATURATION: 93 % | TEMPERATURE: 98.6 F | HEART RATE: 75 BPM | WEIGHT: 206 LBS | HEIGHT: 63 IN | DIASTOLIC BLOOD PRESSURE: 77 MMHG

## 2021-11-09 DIAGNOSIS — K42.9 UMBILICAL HERNIA W/OUT OBSTRUCTION OR GANGRENE: ICD-10-CM

## 2021-11-09 PROCEDURE — G0439: CPT

## 2021-11-09 PROCEDURE — 93000 ELECTROCARDIOGRAM COMPLETE: CPT | Mod: 59

## 2021-11-09 PROCEDURE — 99214 OFFICE O/P EST MOD 30 MIN: CPT

## 2021-11-09 PROCEDURE — 36415 COLL VENOUS BLD VENIPUNCTURE: CPT

## 2021-11-09 RX ORDER — FLUTICASONE PROPIONATE AND SALMETEROL 50; 250 UG/1; UG/1
250-50 POWDER RESPIRATORY (INHALATION)
Qty: 1 | Refills: 3 | Status: ACTIVE | COMMUNITY
Start: 2021-05-27 | End: 1900-01-01

## 2021-11-09 NOTE — HISTORY OF PRESENT ILLNESS
[Former] : former [Never] : never [TextBox_4] : Patient is a 71-year-old male with past medical history significant for obstructive sleep apnea currently on CPAP, hypothyroidism, hypertension, BPH who presents today for follow-up.  The patient continues to lose weight with exercise.  He denies fevers chills chest pain weight loss or hemoptysis.  He states that he uses his CPAP a minimum of 4 hours/day on a daily basis

## 2021-11-09 NOTE — ASSESSMENT
[FreeTextEntry1] : In summary the patient is a 71-year-old obese male with past medical history significant for obstructive sleep apnea currently on CPAP, hypertension, hypothyroidism who presents today for follow-up.\par The patient's physical exam is significant for good air entry bilaterally.  Prescription renewal performed.  Patient instructed to continue current therapy and follow-up in 3 months

## 2021-11-13 ENCOUNTER — NON-APPOINTMENT (OUTPATIENT)
Age: 71
End: 2021-11-13

## 2021-11-13 NOTE — HEALTH RISK ASSESSMENT
[Good] : ~his/her~  mood as  good [No] : In the past 12 months have you used drugs other than those required for medical reasons? No [No falls in past year] : Patient reported no falls in the past year [0] : 2) Feeling down, depressed, or hopeless: Not at all (0) [] : No [Audit-CScore] : 1 [de-identified] : sedentary [de-identified] : regular [[id number]] : 0 [ColonoscopyDate] : 01/2019

## 2021-11-13 NOTE — PLAN
[FreeTextEntry1] : Blood tests and urine sent to the lab\par \par EKG\par \par Continue same medications\par \par Follow-up with SIL[facility]ANO

## 2021-11-13 NOTE — END OF VISIT
[FreeTextEntry3] : I, Braulio Walsh, personally transcribed these services in the presence of Dr. Saeed Goodman MD. I, Dr. Saeed Goodman MD, personally performed the services described in this documentation as scribed by Braulio Walsh in my presence and it is both accurate and complete.

## 2021-11-13 NOTE — PHYSICAL EXAM
[No Acute Distress] : no acute distress [Well Nourished] : well nourished [Well Developed] : well developed [Well-Appearing] : well-appearing [Normal Sclera/Conjunctiva] : normal sclera/conjunctiva [PERRL] : pupils equal round and reactive to light [EOMI] : extraocular movements intact [Normal Outer Ear/Nose] : the outer ears and nose were normal in appearance [Normal Oropharynx] : the oropharynx was normal [No JVD] : no jugular venous distention [No Lymphadenopathy] : no lymphadenopathy [Supple] : supple [Thyroid Normal, No Nodules] : the thyroid was normal and there were no nodules present [No Respiratory Distress] : no respiratory distress  [No Accessory Muscle Use] : no accessory muscle use [Clear to Auscultation] : lungs were clear to auscultation bilaterally [Normal Rate] : normal rate  [Regular Rhythm] : with a regular rhythm [Normal S1, S2] : normal S1 and S2 [No Murmur] : no murmur heard [No Carotid Bruits] : no carotid bruits [No Abdominal Bruit] : a ~M bruit was not heard ~T in the abdomen [No Varicosities] : no varicosities [Pedal Pulses Present] : the pedal pulses are present [No Edema] : there was no peripheral edema [No Palpable Aorta] : no palpable aorta [No Extremity Clubbing/Cyanosis] : no extremity clubbing/cyanosis [Soft] : abdomen soft [Non Tender] : non-tender [Non-distended] : non-distended [No Masses] : no abdominal mass palpated [No HSM] : no HSM [Normal Bowel Sounds] : normal bowel sounds [Normal Posterior Cervical Nodes] : no posterior cervical lymphadenopathy [Normal Anterior Cervical Nodes] : no anterior cervical lymphadenopathy [No CVA Tenderness] : no CVA  tenderness [No Spinal Tenderness] : no spinal tenderness [No Joint Swelling] : no joint swelling [Grossly Normal Strength/Tone] : grossly normal strength/tone [No Rash] : no rash [Coordination Grossly Intact] : coordination grossly intact [No Focal Deficits] : no focal deficits [Normal Gait] : normal gait [Deep Tendon Reflexes (DTR)] : deep tendon reflexes were 2+ and symmetric [Normal Affect] : the affect was normal [Normal Insight/Judgement] : insight and judgment were intact [de-identified] : mild obesity [de-identified] : umbilical hernia [FreeTextEntry1] : deferred

## 2021-11-13 NOTE — HISTORY OF PRESENT ILLNESS
[FreeTextEntry1] : Physical examination  [de-identified] : MARA CAMACHO is a 71 year old male with a PMHx of BPH, HTN, hyporthyroidism, angina class III, and elevated glucose who presents today for physical examination. He is feeling okay and offers no specific complaints. \par \par Pt has completed third dose of COVID-19 vaccine series (Moderna) as well as H Influenza vaccine, Pneumovax, and Shringrex within the last 2 months. \par \par He was seen by  1 month ago and work-up is in process.

## 2021-11-28 LAB
25(OH)D3 SERPL-MCNC: 43.7 NG/ML
ALBUMIN SERPL ELPH-MCNC: 4.7 G/DL
ALP BLD-CCNC: 106 U/L
ALT SERPL-CCNC: 14 U/L
ANION GAP SERPL CALC-SCNC: 16 MMOL/L
APPEARANCE: CLEAR
AST SERPL-CCNC: 21 U/L
BACTERIA: NEGATIVE
BILIRUB SERPL-MCNC: 0.4 MG/DL
BILIRUBIN URINE: NEGATIVE
BLOOD URINE: NEGATIVE
BUN SERPL-MCNC: 14 MG/DL
CALCIUM SERPL-MCNC: 9.6 MG/DL
CHLORIDE SERPL-SCNC: 103 MMOL/L
CHOLEST SERPL-MCNC: 179 MG/DL
CO2 SERPL-SCNC: 23 MMOL/L
COLOR: YELLOW
CREAT SERPL-MCNC: 0.87 MG/DL
ESTIMATED AVERAGE GLUCOSE: 120 MG/DL
GLUCOSE QUALITATIVE U: NEGATIVE
GLUCOSE SERPL-MCNC: 83 MG/DL
HBA1C MFR BLD HPLC: 5.8 %
HDLC SERPL-MCNC: 54 MG/DL
HYALINE CASTS: 4 /LPF
KETONES URINE: NEGATIVE
LDLC SERPL CALC-MCNC: 105 MG/DL
LEUKOCYTE ESTERASE URINE: NEGATIVE
MICROSCOPIC-UA: NORMAL
NITRITE URINE: NEGATIVE
NONHDLC SERPL-MCNC: 126 MG/DL
PH URINE: 5.5
POTASSIUM SERPL-SCNC: 4.5 MMOL/L
PROT SERPL-MCNC: 7.4 G/DL
PROTEIN URINE: NORMAL
PSA SERPL-MCNC: 6.94 NG/ML
RED BLOOD CELLS URINE: 2 /HPF
SODIUM SERPL-SCNC: 142 MMOL/L
SPECIFIC GRAVITY URINE: 1.02
SQUAMOUS EPITHELIAL CELLS: 1 /HPF
T3 SERPL-MCNC: 110 NG/DL
T4 FREE SERPL-MCNC: 1.5 NG/DL
T4 SERPL-MCNC: 9.2 UG/DL
TRIGL SERPL-MCNC: 102 MG/DL
TSH SERPL-ACNC: 2.99 UIU/ML
UROBILINOGEN URINE: NORMAL
WHITE BLOOD CELLS URINE: 11 /HPF

## 2021-12-17 LAB
BASOPHILS # BLD AUTO: 0.06 K/UL
BASOPHILS NFR BLD AUTO: 0.7 %
EOSINOPHIL # BLD AUTO: 0.15 K/UL
EOSINOPHIL NFR BLD AUTO: 1.7 %
HCT VFR BLD CALC: 45.2 %
HGB BLD-MCNC: 14.5 G/DL
IMM GRANULOCYTES NFR BLD AUTO: 0.2 %
LYMPHOCYTES # BLD AUTO: 1.31 K/UL
LYMPHOCYTES NFR BLD AUTO: 14.7 %
MAN DIFF?: NORMAL
MCHC RBC-ENTMCNC: 30.3 PG
MCHC RBC-ENTMCNC: 32.1 GM/DL
MCV RBC AUTO: 94.6 FL
MONOCYTES # BLD AUTO: 0.63 K/UL
MONOCYTES NFR BLD AUTO: 7 %
NEUTROPHILS # BLD AUTO: 6.77 K/UL
NEUTROPHILS NFR BLD AUTO: 75.7 %
PLATELET # BLD AUTO: 226 K/UL
RBC # BLD: 4.78 M/UL
RBC # FLD: 13.1 %
WBC # FLD AUTO: 8.94 K/UL

## 2022-01-30 ENCOUNTER — NON-APPOINTMENT (OUTPATIENT)
Age: 72
End: 2022-01-30

## 2022-02-22 ENCOUNTER — APPOINTMENT (OUTPATIENT)
Dept: PULMONOLOGY | Facility: CLINIC | Age: 72
End: 2022-02-22
Payer: MEDICARE

## 2022-02-22 ENCOUNTER — APPOINTMENT (OUTPATIENT)
Dept: INTERNAL MEDICINE | Facility: CLINIC | Age: 72
End: 2022-02-22
Payer: MEDICARE

## 2022-02-22 VITALS
TEMPERATURE: 97.9 F | WEIGHT: 218 LBS | SYSTOLIC BLOOD PRESSURE: 138 MMHG | DIASTOLIC BLOOD PRESSURE: 79 MMHG | HEART RATE: 75 BPM | BODY MASS INDEX: 38.62 KG/M2 | OXYGEN SATURATION: 98 % | HEIGHT: 63 IN

## 2022-02-22 VITALS — DIASTOLIC BLOOD PRESSURE: 80 MMHG | SYSTOLIC BLOOD PRESSURE: 140 MMHG

## 2022-02-22 DIAGNOSIS — R79.9 ABNORMAL FINDING OF BLOOD CHEMISTRY, UNSPECIFIED: ICD-10-CM

## 2022-02-22 PROCEDURE — 99214 OFFICE O/P EST MOD 30 MIN: CPT

## 2022-02-22 PROCEDURE — 36415 COLL VENOUS BLD VENIPUNCTURE: CPT

## 2022-02-22 PROCEDURE — 99214 OFFICE O/P EST MOD 30 MIN: CPT | Mod: 25

## 2022-02-22 NOTE — HISTORY OF PRESENT ILLNESS
[Never] : never [TextBox_4] : Patient is a 71-year-old obese male with past medical history significant for hypothyroidism hypertension BPH obstructive sleep apnea currently on CPAP who presents for follow-up.  The patient states that he is compliant with his CPAP device.  He does state that he has been noncompliant with his diet and exercise.  He currently denies fevers chills chest pain weight loss or hemoptysis

## 2022-02-23 NOTE — END OF VISIT
[FreeTextEntry3] : I, Brooklynn Parikh, personally transcribed these services in the presence of Dr. Saeed Goodman MD. I, Dr. Saeed Goodman MD, personally performed the services described in this documentation as scribed by Brooklynn Parikh in my presence and it is both accurate and complete.

## 2022-02-23 NOTE — HEALTH RISK ASSESSMENT
[Never] : Never [Yes] : Yes [Monthly or less (1 pt)] : Monthly or less (1 point) [No] : In the past 12 months have you used drugs other than those required for medical reasons? No [No falls in past year] : Patient reported no falls in the past year [0] : 2) Feeling down, depressed, or hopeless: Not at all (0) [Good] : ~his/her~  mood as  good [de-identified] : Occasionally  [Audit-CScore] : 1 [de-identified] : sedentary [de-identified] : regular [[id number]] : 0

## 2022-02-23 NOTE — PHYSICAL EXAM
[No Acute Distress] : no acute distress [Well Nourished] : well nourished [Well Developed] : well developed [Well-Appearing] : well-appearing [Normal Sclera/Conjunctiva] : normal sclera/conjunctiva [PERRL] : pupils equal round and reactive to light [EOMI] : extraocular movements intact [Normal Oropharynx] : the oropharynx was normal [Normal Outer Ear/Nose] : the outer ears and nose were normal in appearance [No JVD] : no jugular venous distention [No Lymphadenopathy] : no lymphadenopathy [Supple] : supple [Thyroid Normal, No Nodules] : the thyroid was normal and there were no nodules present [No Respiratory Distress] : no respiratory distress  [No Accessory Muscle Use] : no accessory muscle use [Clear to Auscultation] : lungs were clear to auscultation bilaterally [Normal Rate] : normal rate  [Regular Rhythm] : with a regular rhythm [Normal S1, S2] : normal S1 and S2 [No Carotid Bruits] : no carotid bruits [No Murmur] : no murmur heard [No Abdominal Bruit] : a ~M bruit was not heard ~T in the abdomen [No Varicosities] : no varicosities [Pedal Pulses Present] : the pedal pulses are present [No Edema] : there was no peripheral edema [No Palpable Aorta] : no palpable aorta [No Extremity Clubbing/Cyanosis] : no extremity clubbing/cyanosis [Soft] : abdomen soft [Non Tender] : non-tender [Non-distended] : non-distended [No Masses] : no abdominal mass palpated [No HSM] : no HSM [Normal Bowel Sounds] : normal bowel sounds [Normal Posterior Cervical Nodes] : no posterior cervical lymphadenopathy [Normal Anterior Cervical Nodes] : no anterior cervical lymphadenopathy [No CVA Tenderness] : no CVA  tenderness [No Spinal Tenderness] : no spinal tenderness [No Joint Swelling] : no joint swelling [Grossly Normal Strength/Tone] : grossly normal strength/tone [No Rash] : no rash [Coordination Grossly Intact] : coordination grossly intact [No Focal Deficits] : no focal deficits [Normal Gait] : normal gait [Deep Tendon Reflexes (DTR)] : deep tendon reflexes were 2+ and symmetric [Normal Affect] : the affect was normal [Normal Insight/Judgement] : insight and judgment were intact [FreeTextEntry1] : deferred [de-identified] : mild obesity

## 2022-02-23 NOTE — HISTORY OF PRESENT ILLNESS
[FreeTextEntry1] : Follow-up \par  [de-identified] : MARA CAMACHO is a 71 year old male with a PMHx of angina class III, BPH, blood glucose elevated, HTN, hypothyroidism, LUKE on CPAP, and umbilical hernia who presents today for follow-up. Pt was found to have elevated PSA and was seen by Urologist and MRI of prostate was done. \par \par He is feeling okay and offers no specific complaints.\par \par Pt has completed the COVID-19 series vaccination with booster (Moderna)\par

## 2022-03-06 LAB
25(OH)D3 SERPL-MCNC: 30.5 NG/ML
ALBUMIN SERPL ELPH-MCNC: 4.5 G/DL
ALP BLD-CCNC: 109 U/L
ALT SERPL-CCNC: 14 U/L
ANION GAP SERPL CALC-SCNC: 15 MMOL/L
AST SERPL-CCNC: 20 U/L
BASOPHILS # BLD AUTO: 0.09 K/UL
BASOPHILS NFR BLD AUTO: 1.2 %
BILIRUB SERPL-MCNC: 0.4 MG/DL
BUN SERPL-MCNC: 16 MG/DL
CALCIUM SERPL-MCNC: 9.5 MG/DL
CHLORIDE SERPL-SCNC: 102 MMOL/L
CO2 SERPL-SCNC: 24 MMOL/L
CREAT SERPL-MCNC: 0.84 MG/DL
EOSINOPHIL # BLD AUTO: 0.12 K/UL
EOSINOPHIL NFR BLD AUTO: 1.6 %
ESTIMATED AVERAGE GLUCOSE: 128 MG/DL
GLUCOSE SERPL-MCNC: 87 MG/DL
HBA1C MFR BLD HPLC: 6.1 %
HCT VFR BLD CALC: 43 %
HGB BLD-MCNC: 13.8 G/DL
IMM GRANULOCYTES NFR BLD AUTO: 0.5 %
LYMPHOCYTES # BLD AUTO: 1.98 K/UL
LYMPHOCYTES NFR BLD AUTO: 25.8 %
MAN DIFF?: NORMAL
MCHC RBC-ENTMCNC: 30.5 PG
MCHC RBC-ENTMCNC: 32.1 GM/DL
MCV RBC AUTO: 95.1 FL
MONOCYTES # BLD AUTO: 0.66 K/UL
MONOCYTES NFR BLD AUTO: 8.6 %
NEUTROPHILS # BLD AUTO: 4.78 K/UL
NEUTROPHILS NFR BLD AUTO: 62.3 %
PLATELET # BLD AUTO: 249 K/UL
POTASSIUM SERPL-SCNC: 4.4 MMOL/L
PROT SERPL-MCNC: 7.1 G/DL
PSA SERPL-MCNC: 6.84 NG/ML
RBC # BLD: 4.52 M/UL
RBC # FLD: 13.1 %
SODIUM SERPL-SCNC: 140 MMOL/L
T3 SERPL-MCNC: 118 NG/DL
T4 FREE SERPL-MCNC: 1.3 NG/DL
T4 SERPL-MCNC: 7.7 UG/DL
TSH SERPL-ACNC: 5.91 UIU/ML
WBC # FLD AUTO: 7.67 K/UL

## 2022-03-08 LAB
CHOLEST SERPL-MCNC: 200 MG/DL
HDLC SERPL-MCNC: 62 MG/DL
LDLC SERPL CALC-MCNC: 117 MG/DL
NONHDLC SERPL-MCNC: 138 MG/DL
TRIGL SERPL-MCNC: 108 MG/DL

## 2022-03-09 ENCOUNTER — RX RENEWAL (OUTPATIENT)
Age: 72
End: 2022-03-09

## 2022-04-30 ENCOUNTER — RX RENEWAL (OUTPATIENT)
Age: 72
End: 2022-04-30

## 2022-05-10 ENCOUNTER — APPOINTMENT (OUTPATIENT)
Dept: INTERNAL MEDICINE | Facility: CLINIC | Age: 72
End: 2022-05-10
Payer: MEDICARE

## 2022-05-10 ENCOUNTER — RX RENEWAL (OUTPATIENT)
Age: 72
End: 2022-05-10

## 2022-05-10 VITALS
TEMPERATURE: 97.9 F | RESPIRATION RATE: 16 BRPM | WEIGHT: 210 LBS | HEIGHT: 63 IN | HEART RATE: 84 BPM | DIASTOLIC BLOOD PRESSURE: 79 MMHG | SYSTOLIC BLOOD PRESSURE: 132 MMHG | OXYGEN SATURATION: 98 % | BODY MASS INDEX: 37.21 KG/M2

## 2022-05-10 DIAGNOSIS — Z11.59 ENCOUNTER FOR SCREENING FOR OTHER VIRAL DISEASES: ICD-10-CM

## 2022-05-10 DIAGNOSIS — J06.9 ACUTE UPPER RESPIRATORY INFECTION, UNSPECIFIED: ICD-10-CM

## 2022-05-10 PROCEDURE — 99214 OFFICE O/P EST MOD 30 MIN: CPT | Mod: 25

## 2022-05-12 NOTE — HISTORY OF PRESENT ILLNESS
[FreeTextEntry8] : MARA CAMACHO is a 71 year old male with a PMHx of Angina Class III,  elevated glucose, BPH, hypothyroidism, LUKE on CPAP, umbilical hernia, and HTN who presents today complaining of nasal congestion, cough , and green phlegm. \par \par Pt denies fever, SOB\par \par Pt is following ADA diet. Pt is currently taking Levothyroxine 100 MCG QD  \par \par Pt was seen by a Urologist last week and was prescribed cefuroxime for 1 week.

## 2022-05-12 NOTE — REVIEW OF SYSTEMS
[Cough] : cough [Negative] : Heme/Lymph [Fever] : no fever [Shortness Of Breath] : no shortness of breath [FreeTextEntry4] : nasal congestion, green phlegm

## 2022-05-12 NOTE — PLAN
[FreeTextEntry1] : Flonase 2 puffs to each nostrils QD\par \par Promethazine 1 TSP PO q6hrs prn cough \par \par COVID-19 PCR \par \par Increase PO fluids\par \par Continue cefuroxime as prescribed by  \par \par

## 2022-05-12 NOTE — HEALTH RISK ASSESSMENT
[Never] : Never [Yes] : Yes [Monthly or less (1 pt)] : Monthly or less (1 point) [No] : In the past 12 months have you used drugs other than those required for medical reasons? No [No falls in past year] : Patient reported no falls in the past year [0] : 2) Feeling down, depressed, or hopeless: Not at all (0) [Good] : ~his/her~  mood as  good [Assistive Device] : Patient uses an assistive device [de-identified] : Occasionally  [Audit-CScore] : 1 [de-identified] : sedentary [de-identified] : ADA- diet  [de-identified] : walking cane  [RQF5Optmi] : 0

## 2022-05-12 NOTE — END OF VISIT
[FreeTextEntry3] : I, Brooklynn Parikh, personally transcribed these services in the presence of Dr. Saeed Goodman MD. I, Dr. Saeed Goodman MD, personally performed the services described in this documentation as scribed by Brooklynn aPrikh in my presence and it is both accurate and complete.

## 2022-05-12 NOTE — PHYSICAL EXAM
[No Acute Distress] : no acute distress [Well Nourished] : well nourished [Well Developed] : well developed [Well-Appearing] : well-appearing [Normal Sclera/Conjunctiva] : normal sclera/conjunctiva [PERRL] : pupils equal round and reactive to light [EOMI] : extraocular movements intact [Normal Outer Ear/Nose] : the outer ears and nose were normal in appearance [Normal Oropharynx] : the oropharynx was normal [No JVD] : no jugular venous distention [No Lymphadenopathy] : no lymphadenopathy [Supple] : supple [Thyroid Normal, No Nodules] : the thyroid was normal and there were no nodules present [No Respiratory Distress] : no respiratory distress  [No Accessory Muscle Use] : no accessory muscle use [Clear to Auscultation] : lungs were clear to auscultation bilaterally [Normal Rate] : normal rate  [Regular Rhythm] : with a regular rhythm [Normal S1, S2] : normal S1 and S2 [No Murmur] : no murmur heard [No Carotid Bruits] : no carotid bruits [No Abdominal Bruit] : a ~M bruit was not heard ~T in the abdomen [No Varicosities] : no varicosities [Pedal Pulses Present] : the pedal pulses are present [No Edema] : there was no peripheral edema [No Palpable Aorta] : no palpable aorta [No Extremity Clubbing/Cyanosis] : no extremity clubbing/cyanosis [Soft] : abdomen soft [Non Tender] : non-tender [Non-distended] : non-distended [No Masses] : no abdominal mass palpated [No HSM] : no HSM [Normal Bowel Sounds] : normal bowel sounds [Normal Posterior Cervical Nodes] : no posterior cervical lymphadenopathy [Normal Anterior Cervical Nodes] : no anterior cervical lymphadenopathy [No CVA Tenderness] : no CVA  tenderness [No Spinal Tenderness] : no spinal tenderness [No Joint Swelling] : no joint swelling [Grossly Normal Strength/Tone] : grossly normal strength/tone [No Rash] : no rash [Coordination Grossly Intact] : coordination grossly intact [No Focal Deficits] : no focal deficits [Normal Gait] : normal gait [Deep Tendon Reflexes (DTR)] : deep tendon reflexes were 2+ and symmetric [Normal Affect] : the affect was normal [Normal Insight/Judgement] : insight and judgment were intact [de-identified] : obese  [de-identified] : mildly erythematous throat

## 2022-05-15 LAB — SARS-COV-2 N GENE NPH QL NAA+PROBE: NOT DETECTED

## 2022-05-17 ENCOUNTER — APPOINTMENT (OUTPATIENT)
Dept: PULMONOLOGY | Facility: CLINIC | Age: 72
End: 2022-05-17
Payer: MEDICARE

## 2022-05-17 VITALS
RESPIRATION RATE: 16 BRPM | WEIGHT: 213 LBS | OXYGEN SATURATION: 91 % | HEIGHT: 63 IN | BODY MASS INDEX: 37.74 KG/M2 | TEMPERATURE: 98.5 F | SYSTOLIC BLOOD PRESSURE: 108 MMHG | DIASTOLIC BLOOD PRESSURE: 70 MMHG | HEART RATE: 68 BPM

## 2022-05-17 PROCEDURE — 99214 OFFICE O/P EST MOD 30 MIN: CPT

## 2022-05-17 NOTE — REVIEW OF SYSTEMS
[Fever] : no fever [Fatigue] : fatigue [Chills] : no chills [Poor Appetite] : no poor appetite [Cough] : cough [Sputum] : no sputum [Dyspnea] : dyspnea [Wheezing] : wheezing [A.M. Dry Mouth] : a.m. dry mouth [SOB on Exertion] : sob on exertion [Negative] : Endocrine

## 2022-05-17 NOTE — PHYSICAL EXAM
[No Acute Distress] : no acute distress [Normal Oropharynx] : normal oropharynx [III] : Mallampati Class: III [Normal Appearance] : normal appearance [No Neck Mass] : no neck mass [Normal Rate/Rhythm] : normal rate/rhythm [Normal S1, S2] : normal s1, s2 [No Murmurs] : no murmurs [No Resp Distress] : no resp distress [Wheeze] : wheeze [No Abnormalities] : no abnormalities [Benign] : benign [Normal Gait] : normal gait [No Clubbing] : no clubbing [No Cyanosis] : no cyanosis [No Edema] : no edema [FROM] : FROM [Normal Color/ Pigmentation] : normal color/ pigmentation [No Focal Deficits] : no focal deficits [Oriented x3] : oriented x3 [Normal Affect] : normal affect

## 2022-05-17 NOTE — ASSESSMENT
[FreeTextEntry1] : In summary the patient is a 71-year-old obese male with past medical history significant for obstructive sleep apnea currently on CPAP, hypertension, hypothyroidism who presents today for follow-up.\par The patient's physical exam is significant for scattered wheezing\par The patient was treated with nebulized albuterol 0.063 mg and 3 cc of normal saline.  I had a lengthy discussion with the patient and his wife.  The patient requires to remain on portable and home oxygen.  The patient should be on 3 L with ambulation while using his portable concentrator and 2 L when home and at rest.  The patient is instructed to continue current therapy and follow-up in 3 months.  Prescription renewal performed

## 2022-05-17 NOTE — HISTORY OF PRESENT ILLNESS
[Former] : former [Never] : never [TextBox_4] : Patient is a 71-year-old obese male with past medical history significant for obstructive sleep apnea currently on CPAP, chronic restrictive lung disease on home and portable oxygen, hypertension, hypothyroidism who presents today for follow-up.  The patient complains of cough shortness of breath and dyspnea on exertion.  He states that he uses his oxygen concentrator at night and his portable during the day.  He is also compliant with his CPAP.  He currently denies fevers chills chest pain weight loss or hemoptysis

## 2022-05-17 NOTE — REASON FOR VISIT
[Follow-Up] : a follow-up visit [Sleep Apnea] : sleep apnea [Cough] : cough [COPD] : COPD [Shortness of Breath] : shortness of breath [Wheezing] : wheezing [Spouse] : spouse

## 2022-05-20 ENCOUNTER — APPOINTMENT (OUTPATIENT)
Dept: INTERNAL MEDICINE | Facility: CLINIC | Age: 72
End: 2022-05-20
Payer: MEDICARE

## 2022-05-20 ENCOUNTER — NON-APPOINTMENT (OUTPATIENT)
Age: 72
End: 2022-05-20

## 2022-05-20 VITALS
HEART RATE: 64 BPM | WEIGHT: 212 LBS | OXYGEN SATURATION: 95 % | SYSTOLIC BLOOD PRESSURE: 132 MMHG | BODY MASS INDEX: 37.56 KG/M2 | HEIGHT: 63 IN | DIASTOLIC BLOOD PRESSURE: 79 MMHG | TEMPERATURE: 98.3 F

## 2022-05-20 DIAGNOSIS — R73.9 HYPERGLYCEMIA, UNSPECIFIED: ICD-10-CM

## 2022-05-20 PROCEDURE — 99214 OFFICE O/P EST MOD 30 MIN: CPT | Mod: 25

## 2022-05-20 PROCEDURE — 93000 ELECTROCARDIOGRAM COMPLETE: CPT

## 2022-05-20 PROCEDURE — 36415 COLL VENOUS BLD VENIPUNCTURE: CPT

## 2022-05-22 LAB
25(OH)D3 SERPL-MCNC: 40 NG/ML
ALBUMIN SERPL ELPH-MCNC: 4.4 G/DL
ALP BLD-CCNC: 98 U/L
ALT SERPL-CCNC: 16 U/L
ANION GAP SERPL CALC-SCNC: 12 MMOL/L
APPEARANCE: CLEAR
AST SERPL-CCNC: 23 U/L
BACTERIA: NEGATIVE
BASOPHILS # BLD AUTO: 0.1 K/UL
BASOPHILS NFR BLD AUTO: 1.4 %
BILIRUB SERPL-MCNC: 0.4 MG/DL
BILIRUBIN URINE: NEGATIVE
BLOOD URINE: NEGATIVE
BUN SERPL-MCNC: 21 MG/DL
CALCIUM SERPL-MCNC: 9.5 MG/DL
CHLORIDE SERPL-SCNC: 104 MMOL/L
CHOLEST SERPL-MCNC: 175 MG/DL
CO2 SERPL-SCNC: 25 MMOL/L
COLOR: NORMAL
CREAT SERPL-MCNC: 0.93 MG/DL
EGFR: 87 ML/MIN/1.73M2
EOSINOPHIL # BLD AUTO: 0.14 K/UL
EOSINOPHIL NFR BLD AUTO: 2 %
ESTIMATED AVERAGE GLUCOSE: 131 MG/DL
GLUCOSE QUALITATIVE U: NEGATIVE
GLUCOSE SERPL-MCNC: 108 MG/DL
HBA1C MFR BLD HPLC: 6.2 %
HCT VFR BLD CALC: 42.6 %
HDLC SERPL-MCNC: 50 MG/DL
HGB BLD-MCNC: 13.7 G/DL
HYALINE CASTS: 2 /LPF
IMM GRANULOCYTES NFR BLD AUTO: 0.4 %
KETONES URINE: NEGATIVE
LDLC SERPL CALC-MCNC: 113 MG/DL
LEUKOCYTE ESTERASE URINE: ABNORMAL
LYMPHOCYTES # BLD AUTO: 2 K/UL
LYMPHOCYTES NFR BLD AUTO: 28.8 %
MAN DIFF?: NORMAL
MCHC RBC-ENTMCNC: 30.2 PG
MCHC RBC-ENTMCNC: 32.2 GM/DL
MCV RBC AUTO: 94 FL
MICROSCOPIC-UA: NORMAL
MONOCYTES # BLD AUTO: 0.61 K/UL
MONOCYTES NFR BLD AUTO: 8.8 %
NEUTROPHILS # BLD AUTO: 4.07 K/UL
NEUTROPHILS NFR BLD AUTO: 58.6 %
NITRITE URINE: NEGATIVE
NONHDLC SERPL-MCNC: 125 MG/DL
PH URINE: 7
PLATELET # BLD AUTO: 278 K/UL
POTASSIUM SERPL-SCNC: 4.9 MMOL/L
PROT SERPL-MCNC: 7.1 G/DL
PROTEIN URINE: NORMAL
PSA SERPL-MCNC: 7.09 NG/ML
RBC # BLD: 4.53 M/UL
RBC # FLD: 12.9 %
RED BLOOD CELLS URINE: 2 /HPF
SODIUM SERPL-SCNC: 141 MMOL/L
SPECIFIC GRAVITY URINE: 1.02
SQUAMOUS EPITHELIAL CELLS: 1 /HPF
T3 SERPL-MCNC: 106 NG/DL
T4 FREE SERPL-MCNC: 1.2 NG/DL
T4 SERPL-MCNC: 8 UG/DL
TRIGL SERPL-MCNC: 63 MG/DL
TSH SERPL-ACNC: 3.19 UIU/ML
UROBILINOGEN URINE: NORMAL
WBC # FLD AUTO: 6.95 K/UL
WHITE BLOOD CELLS URINE: 12 /HPF

## 2022-05-23 ENCOUNTER — NON-APPOINTMENT (OUTPATIENT)
Age: 72
End: 2022-05-23

## 2022-05-23 NOTE — PHYSICAL EXAM
[No Acute Distress] : no acute distress [Well Nourished] : well nourished [Well Developed] : well developed [Well-Appearing] : well-appearing [Normal Sclera/Conjunctiva] : normal sclera/conjunctiva [PERRL] : pupils equal round and reactive to light [EOMI] : extraocular movements intact [Normal Outer Ear/Nose] : the outer ears and nose were normal in appearance [Normal Oropharynx] : the oropharynx was normal [No JVD] : no jugular venous distention [No Lymphadenopathy] : no lymphadenopathy [Supple] : supple [Thyroid Normal, No Nodules] : the thyroid was normal and there were no nodules present [No Respiratory Distress] : no respiratory distress  [No Accessory Muscle Use] : no accessory muscle use [Clear to Auscultation] : lungs were clear to auscultation bilaterally [Normal Rate] : normal rate  [Regular Rhythm] : with a regular rhythm [Normal S1, S2] : normal S1 and S2 [No Murmur] : no murmur heard [No Carotid Bruits] : no carotid bruits [No Abdominal Bruit] : a ~M bruit was not heard ~T in the abdomen [No Varicosities] : no varicosities [Pedal Pulses Present] : the pedal pulses are present [No Edema] : there was no peripheral edema [No Palpable Aorta] : no palpable aorta [No Extremity Clubbing/Cyanosis] : no extremity clubbing/cyanosis [Soft] : abdomen soft [Non Tender] : non-tender [Non-distended] : non-distended [No Masses] : no abdominal mass palpated [No HSM] : no HSM [Normal Bowel Sounds] : normal bowel sounds [Normal Posterior Cervical Nodes] : no posterior cervical lymphadenopathy [Normal Anterior Cervical Nodes] : no anterior cervical lymphadenopathy [No CVA Tenderness] : no CVA  tenderness [No Spinal Tenderness] : no spinal tenderness [No Joint Swelling] : no joint swelling [Grossly Normal Strength/Tone] : grossly normal strength/tone [No Rash] : no rash [Coordination Grossly Intact] : coordination grossly intact [No Focal Deficits] : no focal deficits [Normal Gait] : normal gait [Deep Tendon Reflexes (DTR)] : deep tendon reflexes were 2+ and symmetric [Normal Affect] : the affect was normal [Normal Insight/Judgement] : insight and judgment were intact [de-identified] : obese [FreeTextEntry1] : deferred

## 2022-05-23 NOTE — HISTORY OF PRESENT ILLNESS
[FreeTextEntry1] : Follow-up  [de-identified] : MARA CAMACHO is a 71 year old male with a PMHx of Class III angina, BPH, HTN, hypothyroidism, LUKE on CPAP, umbilicial hernia, and elevated glucose who presents today for follow-up.  He is feeling okay and offers no specific complaints.\par \par Pt completed his treatment with antibiotics \par

## 2022-05-23 NOTE — HEALTH RISK ASSESSMENT
[Never] : Never [Yes] : Yes [Monthly or less (1 pt)] : Monthly or less (1 point) [No] : In the past 12 months have you used drugs other than those required for medical reasons? No [No falls in past year] : Patient reported no falls in the past year [Assistive Device] : Patient uses an assistive device [0] : 2) Feeling down, depressed, or hopeless: Not at all (0) [Good] : ~his/her~  mood as  good [de-identified] : Occasionally  [Audit-CScore] : 1 [de-identified] : sedentary [de-identified] : ADA- diet  [de-identified] : walking cane  [[id number]] : 0

## 2022-06-07 ENCOUNTER — RX RENEWAL (OUTPATIENT)
Age: 72
End: 2022-06-07

## 2022-08-16 ENCOUNTER — APPOINTMENT (OUTPATIENT)
Dept: PULMONOLOGY | Facility: CLINIC | Age: 72
End: 2022-08-16
Payer: MEDICARE

## 2022-08-16 ENCOUNTER — APPOINTMENT (OUTPATIENT)
Dept: PULMONOLOGY | Facility: CLINIC | Age: 72
End: 2022-08-16

## 2022-08-16 VITALS
OXYGEN SATURATION: 93 % | TEMPERATURE: 98.2 F | RESPIRATION RATE: 16 BRPM | HEIGHT: 63 IN | SYSTOLIC BLOOD PRESSURE: 132 MMHG | WEIGHT: 223 LBS | HEART RATE: 74 BPM | DIASTOLIC BLOOD PRESSURE: 77 MMHG | BODY MASS INDEX: 39.51 KG/M2

## 2022-08-16 DIAGNOSIS — R06.83 SNORING: ICD-10-CM

## 2022-08-16 DIAGNOSIS — I20.9 ANGINA PECTORIS, UNSPECIFIED: ICD-10-CM

## 2022-08-16 PROCEDURE — 94060 EVALUATION OF WHEEZING: CPT

## 2022-08-16 PROCEDURE — 99214 OFFICE O/P EST MOD 30 MIN: CPT | Mod: 25

## 2022-08-16 PROCEDURE — 94729 DIFFUSING CAPACITY: CPT

## 2022-08-16 PROCEDURE — 94726 PLETHYSMOGRAPHY LUNG VOLUMES: CPT

## 2022-08-16 PROCEDURE — ZZZZZ: CPT | Mod: 1L

## 2022-09-15 PROBLEM — I20.9 ANGINA, CLASS III: Status: ACTIVE | Noted: 2019-11-19

## 2022-10-07 ENCOUNTER — RX RENEWAL (OUTPATIENT)
Age: 72
End: 2022-10-07

## 2022-10-24 NOTE — ASSESSMENT
[FreeTextEntry1] : 70 yo M with PMH COPD on home O2, LUKE on BIPAP, obesity, hypothyroidism, HTN here for first evaluation with this provider. \par \par DYSPNEA ON EXERTION\par -The patient reports a long history of dyspnea on exertion which is likely multifactorial and includes COPD and LUKE.\par -Negative nuclear stress, advised to f/u with pulmonologist, Dr Alexander\par \par CHEST PAIN\par -Occasional exertional CP only, most likely secondary to COPD \par -f/u with pulm\par \par HTN \par -Well controlled\par -Continue amlodipine 5 mg daily\par - recommend healthy  diet and exercise\par \par \par HLD\par -Will recheck lipid panel prior to next visit\par \par Follow up in 3 months\par   Detail Level: Generalized Detail Level: Zone

## 2022-10-24 NOTE — ASSESSMENT
[FreeTextEntry1] : In summary the patient is a 72-year-old obese male with past medical history significant for obstructive sleep apnea currently on CPAP, hypertension, hypothyroidism who presents today for follow-up.\par The patient's physical exam is significant for scattered wheezing\par Repeat pulmonary function test ordered.  Patient instructed to continue current medications and follow-up in 1 month

## 2022-10-24 NOTE — PHYSICAL EXAM
[No Acute Distress] : no acute distress [Normal Oropharynx] : normal oropharynx [III] : Mallampati Class: III [Normal Appearance] : normal appearance [No Neck Mass] : no neck mass [Normal Rate/Rhythm] : normal rate/rhythm [Normal S1, S2] : normal s1, s2 [No Murmurs] : no murmurs [No Resp Distress] : no resp distress [Wheeze] : wheeze [No Abnormalities] : no abnormalities [Benign] : benign [Normal Gait] : normal gait [No Clubbing] : no clubbing [No Cyanosis] : no cyanosis [No Edema] : no edema [FROM] : FROM [Normal Color/ Pigmentation] : normal color/ pigmentation [No Focal Deficits] : no focal deficits [Oriented x3] : oriented x3 [Normal Affect] : normal affect Yes

## 2022-10-24 NOTE — HISTORY OF PRESENT ILLNESS
[Former] : former [Never] : never [TextBox_4] : Patient is a 72-year-old morbidly obese male past medical history significant for obstructive sleep apnea currently on CPAP chronic restrictive lung disease on home and portable oxygen hypothyroidism who presents today for follow-up.  The patient complains of cough shortness of breath and dyspnea on exertion.  He currently denies fevers chills chest pain weight loss or hemoptysis

## 2022-10-24 NOTE — REASON FOR VISIT
[Dysphagia] : dysphagia [Shortness of Breath] : shortness of breath [Follow-Up] : a follow-up visit [Sleep Apnea] : sleep apnea [Cough] : cough [COPD] : COPD

## 2022-11-06 ENCOUNTER — RX RENEWAL (OUTPATIENT)
Age: 72
End: 2022-11-06

## 2022-12-16 ENCOUNTER — RX RENEWAL (OUTPATIENT)
Age: 72
End: 2022-12-16

## 2023-04-26 ENCOUNTER — RX RENEWAL (OUTPATIENT)
Age: 73
End: 2023-04-26

## 2023-05-16 ENCOUNTER — NON-APPOINTMENT (OUTPATIENT)
Age: 73
End: 2023-05-16

## 2023-05-16 ENCOUNTER — APPOINTMENT (OUTPATIENT)
Dept: INTERNAL MEDICINE | Facility: CLINIC | Age: 73
End: 2023-05-16
Payer: MEDICARE

## 2023-05-16 ENCOUNTER — APPOINTMENT (OUTPATIENT)
Dept: PULMONOLOGY | Facility: CLINIC | Age: 73
End: 2023-05-16
Payer: MEDICARE

## 2023-05-16 ENCOUNTER — APPOINTMENT (OUTPATIENT)
Dept: PULMONOLOGY | Facility: CLINIC | Age: 73
End: 2023-05-16
Payer: COMMERCIAL

## 2023-05-16 VITALS
TEMPERATURE: 97.3 F | BODY MASS INDEX: 39.87 KG/M2 | SYSTOLIC BLOOD PRESSURE: 132 MMHG | DIASTOLIC BLOOD PRESSURE: 78 MMHG | HEIGHT: 63 IN | RESPIRATION RATE: 14 BRPM | WEIGHT: 225 LBS | OXYGEN SATURATION: 93 % | HEART RATE: 70 BPM

## 2023-05-16 DIAGNOSIS — Z87.438 PERSONAL HISTORY OF OTHER DISEASES OF MALE GENITAL ORGANS: ICD-10-CM

## 2023-05-16 PROCEDURE — G0439: CPT

## 2023-05-16 PROCEDURE — 93000 ELECTROCARDIOGRAM COMPLETE: CPT

## 2023-05-16 PROCEDURE — 94729 DIFFUSING CAPACITY: CPT

## 2023-05-16 PROCEDURE — 94726 PLETHYSMOGRAPHY LUNG VOLUMES: CPT

## 2023-05-16 PROCEDURE — 99214 OFFICE O/P EST MOD 30 MIN: CPT | Mod: 25

## 2023-05-16 PROCEDURE — 36415 COLL VENOUS BLD VENIPUNCTURE: CPT

## 2023-05-16 PROCEDURE — 94060 EVALUATION OF WHEEZING: CPT

## 2023-05-16 NOTE — PHYSICAL EXAM
[No Acute Distress] : no acute distress [Normal Oropharynx] : normal oropharynx [III] : Mallampati Class: III [Normal Appearance] : normal appearance [No Neck Mass] : no neck mass [Normal Rate/Rhythm] : normal rate/rhythm [Normal S1, S2] : normal s1, s2 [No Murmurs] : no murmurs [No Resp Distress] : no resp distress [Wheeze] : wheeze [No Abnormalities] : no abnormalities [Benign] : benign [Normal Gait] : normal gait [No Clubbing] : no clubbing [No Edema] : no edema [No Cyanosis] : no cyanosis [FROM] : FROM [Normal Color/ Pigmentation] : normal color/ pigmentation [No Focal Deficits] : no focal deficits [Oriented x3] : oriented x3 [Normal Affect] : normal affect

## 2023-05-16 NOTE — ASSESSMENT
[FreeTextEntry1] : In summary the patient is a 72-year-old obese male with past medical history significant for obstructive sleep apnea currently on CPAP, hypertension, hypothyroidism who presents today for follow-up.\par The patient's physical exam is significant for decreased air entry bilaterally.  He underwent a pulmonary function test which revealed severe obstructive and restrictive lung disease.\par \par He is instructed to continue Trelegy as well as his home oxygen and nocturnal CPAP.  The patient is disabled from a respiratory point of view and qualifies for handicap parking sticker.  Paperwork filled out

## 2023-05-16 NOTE — HISTORY OF PRESENT ILLNESS
[Former] : former [Never] : never [TextBox_4] : Patient is a 72-year-old male past medical history significant for hypertension obstructive sleep apnea currently on CPAP COPD on home oxygen who ambulates with a walker and who presents today for follow-up.  Patient states he is unable to ambulate more than 10 to 20 feet without becoming dyspneic and using his rescue inhaler.  He also states that he uses his CPAP device on a daily basis and uses it for minimum of 4 hours per night.  He currently denies fevers chills chest pain weight loss or hemoptysis

## 2023-05-20 ENCOUNTER — NON-APPOINTMENT (OUTPATIENT)
Age: 73
End: 2023-05-20

## 2023-05-20 NOTE — PLAN
[FreeTextEntry1] : Blood tests and urine sent to the lab\par EKG\par ECHO\par Cardiology evaluation \par Add hydrochlorothiazide 12.5  mg po qd

## 2023-05-20 NOTE — HISTORY OF PRESENT ILLNESS
[FreeTextEntry1] : physical examination  [de-identified] : MARA CAMACHO is a 72 year old male with a PMHx of Class III angina, BPH, HTN, hypothyroidism, LUKE on CPAP, umbilicial hernia, and elevated glucose who presents today for physical examination. He is feeling okay in general however he relates SOB on exertion. He was seen by pulmonary today.

## 2023-05-20 NOTE — PHYSICAL EXAM
[No Acute Distress] : no acute distress [Well Nourished] : well nourished [Well Developed] : well developed [Well-Appearing] : well-appearing [Normal Sclera/Conjunctiva] : normal sclera/conjunctiva [PERRL] : pupils equal round and reactive to light [EOMI] : extraocular movements intact [Normal Outer Ear/Nose] : the outer ears and nose were normal in appearance [Normal Oropharynx] : the oropharynx was normal [No JVD] : no jugular venous distention [No Lymphadenopathy] : no lymphadenopathy [Supple] : supple [Thyroid Normal, No Nodules] : the thyroid was normal and there were no nodules present [No Respiratory Distress] : no respiratory distress  [No Accessory Muscle Use] : no accessory muscle use [Clear to Auscultation] : lungs were clear to auscultation bilaterally [Normal Rate] : normal rate  [Regular Rhythm] : with a regular rhythm [Normal S1, S2] : normal S1 and S2 [No Murmur] : no murmur heard [No Carotid Bruits] : no carotid bruits [No Abdominal Bruit] : a ~M bruit was not heard ~T in the abdomen [Pedal Pulses Present] : the pedal pulses are present [No Palpable Aorta] : no palpable aorta [No Extremity Clubbing/Cyanosis] : no extremity clubbing/cyanosis [Soft] : abdomen soft [Non Tender] : non-tender [Non-distended] : non-distended [No Masses] : no abdominal mass palpated [No HSM] : no HSM [Normal Bowel Sounds] : normal bowel sounds [Normal Posterior Cervical Nodes] : no posterior cervical lymphadenopathy [Normal Anterior Cervical Nodes] : no anterior cervical lymphadenopathy [No CVA Tenderness] : no CVA  tenderness [No Spinal Tenderness] : no spinal tenderness [No Joint Swelling] : no joint swelling [Grossly Normal Strength/Tone] : grossly normal strength/tone [No Rash] : no rash [Coordination Grossly Intact] : coordination grossly intact [No Focal Deficits] : no focal deficits [Normal Gait] : normal gait [Deep Tendon Reflexes (DTR)] : deep tendon reflexes were 2+ and symmetric [Normal Affect] : the affect was normal [Normal Insight/Judgement] : insight and judgment were intact [No Varicosities] : no varicosities [de-identified] : obese [de-identified] : 1+ edema lower extremities;  [de-identified] : diastasis recti  [FreeTextEntry1] : deferred

## 2023-05-20 NOTE — END OF VISIT
[FreeTextEntry3] :  I, Lesa West, personally transcribed these services in the presence of Dr. Saeed Goodman MD.\par I, Dr. Saede Goodman MD, personally performed the services described in this documentation as scribed by Lesa West in my presence and it is both accurate and complete.

## 2023-05-20 NOTE — HEALTH RISK ASSESSMENT
[Good] : ~his/her~  mood as  good [Yes] : Yes [Monthly or less (1 pt)] : Monthly or less (1 point) [No] : In the past 12 months have you used drugs other than those required for medical reasons? No [No falls in past year] : Patient reported no falls in the past year [Assistive Device] : Patient uses an assistive device [0] : 2) Feeling down, depressed, or hopeless: Not at all (0) [Never] : Never [Feels Safe at Home] : Feels safe at home [Alone] : lives alone [Retired] : retired [de-identified] : Occasionally  [Audit-CScore] : 1 [de-identified] : sedentary [de-identified] : ADA- diet  [de-identified] : walking cane  [[id number]] : 0

## 2023-05-22 ENCOUNTER — APPOINTMENT (OUTPATIENT)
Dept: HEART AND VASCULAR | Facility: CLINIC | Age: 73
End: 2023-05-22
Payer: MEDICARE

## 2023-05-22 LAB
ALBUMIN SERPL ELPH-MCNC: 4.6 G/DL
ALP BLD-CCNC: 99 U/L
ALT SERPL-CCNC: 15 U/L
ANION GAP SERPL CALC-SCNC: 14 MMOL/L
APPEARANCE: CLEAR
AST SERPL-CCNC: 20 U/L
BACTERIA: NEGATIVE /HPF
BILIRUB SERPL-MCNC: 0.4 MG/DL
BILIRUBIN URINE: NEGATIVE
BLOOD URINE: NEGATIVE
BUN SERPL-MCNC: 15 MG/DL
CALCIUM SERPL-MCNC: 10 MG/DL
CAST: 0 /LPF
CHLORIDE SERPL-SCNC: 105 MMOL/L
CHOLEST SERPL-MCNC: 163 MG/DL
CO2 SERPL-SCNC: 25 MMOL/L
COLOR: YELLOW
CREAT SERPL-MCNC: 0.82 MG/DL
EGFR: 92 ML/MIN/1.73M2
EPITHELIAL CELLS: 0 /HPF
ESTIMATED AVERAGE GLUCOSE: 128 MG/DL
GLUCOSE QUALITATIVE U: NEGATIVE MG/DL
GLUCOSE SERPL-MCNC: 97 MG/DL
HBA1C MFR BLD HPLC: 6.1 %
HDLC SERPL-MCNC: 50 MG/DL
KETONES URINE: NEGATIVE MG/DL
LDLC SERPL CALC-MCNC: 97 MG/DL
LEUKOCYTE ESTERASE URINE: NEGATIVE
MICROSCOPIC-UA: NORMAL
NITRITE URINE: NEGATIVE
NONHDLC SERPL-MCNC: 113 MG/DL
PH URINE: 5.5
POTASSIUM SERPL-SCNC: 4.6 MMOL/L
PROT SERPL-MCNC: 7 G/DL
PROTEIN URINE: 30 MG/DL
PSA SERPL-MCNC: 5.49 NG/ML
RED BLOOD CELLS URINE: 1 /HPF
SODIUM SERPL-SCNC: 143 MMOL/L
SPECIFIC GRAVITY URINE: 1.02
T3 SERPL-MCNC: 110 NG/DL
T4 FREE SERPL-MCNC: 1.3 NG/DL
T4 SERPL-MCNC: 7.8 UG/DL
TRIGL SERPL-MCNC: 78 MG/DL
TSH SERPL-ACNC: 3.67 UIU/ML
UROBILINOGEN URINE: 0.2 MG/DL
WHITE BLOOD CELLS URINE: 0 /HPF

## 2023-05-22 PROCEDURE — 93306 TTE W/DOPPLER COMPLETE: CPT

## 2023-05-25 LAB
BASOPHILS # BLD AUTO: 0.08 K/UL
BASOPHILS NFR BLD AUTO: 1 %
EOSINOPHIL # BLD AUTO: 0.21 K/UL
EOSINOPHIL NFR BLD AUTO: 2.6 %
HCT VFR BLD CALC: 44.7 %
HGB BLD-MCNC: 14.3 G/DL
IMM GRANULOCYTES NFR BLD AUTO: 0.2 %
LYMPHOCYTES # BLD AUTO: 2.25 K/UL
LYMPHOCYTES NFR BLD AUTO: 27.9 %
MAN DIFF?: NORMAL
MCHC RBC-ENTMCNC: 31 PG
MCHC RBC-ENTMCNC: 32 GM/DL
MCV RBC AUTO: 96.8 FL
MONOCYTES # BLD AUTO: 0.64 K/UL
MONOCYTES NFR BLD AUTO: 7.9 %
NEUTROPHILS # BLD AUTO: 4.87 K/UL
NEUTROPHILS NFR BLD AUTO: 60.4 %
PLATELET # BLD AUTO: 246 K/UL
RBC # BLD: 4.62 M/UL
RBC # FLD: 12.7 %
WBC # FLD AUTO: 8.07 K/UL

## 2023-05-26 ENCOUNTER — APPOINTMENT (OUTPATIENT)
Dept: HEART AND VASCULAR | Facility: CLINIC | Age: 73
End: 2023-05-26
Payer: MEDICARE

## 2023-05-26 PROCEDURE — 36415 COLL VENOUS BLD VENIPUNCTURE: CPT

## 2023-05-27 LAB
APTT BLD: 31 SEC
INR PPP: 1.02 RATIO
PT BLD: 11.8 SEC

## 2023-06-12 ENCOUNTER — APPOINTMENT (OUTPATIENT)
Dept: HEART AND VASCULAR | Facility: CLINIC | Age: 73
End: 2023-06-12
Payer: MEDICARE

## 2023-06-12 VITALS
TEMPERATURE: 97.7 F | OXYGEN SATURATION: 91 % | HEART RATE: 67 BPM | SYSTOLIC BLOOD PRESSURE: 106 MMHG | WEIGHT: 222 LBS | HEIGHT: 63 IN | RESPIRATION RATE: 16 BRPM | BODY MASS INDEX: 39.34 KG/M2 | DIASTOLIC BLOOD PRESSURE: 66 MMHG

## 2023-06-12 PROCEDURE — 99204 OFFICE O/P NEW MOD 45 MIN: CPT

## 2023-06-12 RX ORDER — LEVOTHYROXINE SODIUM 0.09 MG/1
88 TABLET ORAL DAILY
Qty: 30 | Refills: 3 | Status: DISCONTINUED | COMMUNITY
Start: 2018-05-15 | End: 2023-06-12

## 2023-06-12 NOTE — PHYSICAL EXAM
[Well Developed] : well developed [Well Nourished] : well nourished [No Acute Distress] : no acute distress [Normal Venous Pressure] : normal venous pressure [No Carotid Bruit] : no carotid bruit [Normal S1, S2] : normal S1, S2 [No Murmur] : no murmur [No Rub] : no rub [No Gallop] : no gallop [Clear Lung Fields] : clear lung fields [No Respiratory Distress] : no respiratory distress  [Normal Gait] : normal gait [No Edema] : no edema [No Cyanosis] : no cyanosis [No Clubbing] : no clubbing [Moves all extremities] : moves all extremities [No Focal Deficits] : no focal deficits [Normal Speech] : normal speech [Alert and Oriented] : alert and oriented [Normal memory] : normal memory [de-identified] : Ambulates with a cane  [de-identified] : Scoliosis

## 2023-06-12 NOTE — REVIEW OF SYSTEMS
[Feeling Fatigued] : feeling fatigued [Dyspnea on exertion] : dyspnea during exertion [Fever] : no fever [Headache] : no headache [Chills] : no chills [SOB] : no shortness of breath [Chest Discomfort] : no chest discomfort [Lower Ext Edema] : no extremity edema [Leg Claudication] : no intermittent leg claudication [Palpitations] : no palpitations [Orthopnea] : no orthopnea [PND] : no PND [Syncope] : no syncope [Cough] : no cough [Wheezing] : no wheezing [Coughing Up Blood] : no hemoptysis [Dizziness] : no dizziness [Tremor] : no tremor was seen [Convulsions] : no convulsions [Tingling (Paresthesia)] : no tingling [Limb Weakness (Paresis)] : no limb weakness (Paresis) [Confusion] : no confusion was observed [Depression] : no depression [Anxiety] : no anxiety [Under Stress] : not under stress [Suicidal] : not suicidal [Easy Bleeding] : no tendency for easy bleeding

## 2023-06-12 NOTE — ASSESSMENT
[FreeTextEntry1] : MARA CAMACHO is a 72 year M with past medical history significant for HTN, prediabetes, COPD on home O2, BMI= 39.  He is here for cardiac evaluation of GALLARDO, as described above. Echo was technically limited, grossly normal LV function and no significant valve disease but cannot rwma. \par - I reviewed ECG\par - I reviewed echo report \par - I reviewed labs \par - multiple risk factors and c/o GALLARDO --> schedule a NST to evaluate for ischemia and risk stratify\par - ASCVD 10yr risk is 18.5% --> add atorvastatin 20 mg daily \par - Continue amlodipine 5 mg daily\par - Continue HCTZ\par - Increase ambulation as tolerated to aim for approximately 30 minutes of moderate activity 5 days a week.  Heart healthy diet low in sodium, sugars and saturated fats and high in fruits, vegetables and whole grains.  Weight loss.\par \par \par Patient advised to go to the nearest ED whenever any symptoms persist and/or worsens.  Patient/Family/Caregiver verbalized complete understanding of these instructions.\par \par I spent a total of 45 minutes with more than 50% spent on counseling and coordinating care.\par \par RTO after test results are available \par \par 
5

## 2023-06-12 NOTE — HISTORY OF PRESENT ILLNESS
[FreeTextEntry1] : MARA CAMACHO is a 72 year y.o. M with medical history significant for COPD on home O2, LUKE on CPAP, obesity, hypothyroidism, HTN, prediabetes.  Her is here for cardiac evaluation of GALLARDO that he has had for >1yr. \par He reports limited exercise capacity due dyspnea and fatigue. \par He previously had ankle edema that improved with starting HCTZ. \par Denies CP, SOB (at rest), palpitations, orthopnea, dizziness, syncope, LH\par Patient denies changes in his exercise tolerance during the past 6 months. He can walk 1 block and can climb 1 flight of stairs. \par Sleeps with 1 pillow\par \par He denies history of MI, CVA, DM, smoking. \par Denies family history of premature ASCVD and /or SCD\par \par No hospitalizations in the past year.\par \par \par Home MEDS\par amlodipine 5 mg daily \par HCTZ 12.5 mg \par tamsulosin\par levothyroxine\par \par FH \par mother with MI\par \par SH\par no smokes, social; etoh, no drugs \par \par \par DATA\par LABS (5/16/23): TSH 3.67; ; TG 78; LDL 97; HDL 50; NonHDL 113; K 4.6; Cre 0.82; eGFR 92; LFTs wnl; A1c 6.1\par \par ECG (5/16/23): NSR at 68 bpm w nl axis and no STT abn \par EKG 11/19/2019: Normal sinus rhythm, within normal limits\par \par ECHO (5/22/23): Technically difficult..LV is not well visualized. LV is grossly normal cannot r/o rwma. Mild LVH. Mild AR. Trace MR. \par \par Echocardiogram 11/11/2019: Technically suboptimal study due to the patient's habitus overall left ventricular ejection fraction is 60-65%. Mildly enlarged left atrium\par \par Nuclear Stress 12/13/2019: Normal myocardial perfusion\par Left ventricular function is normal with normal wall motion.  Normal EKG stress\par \par \par

## 2023-07-03 ENCOUNTER — OUTPATIENT (OUTPATIENT)
Dept: OUTPATIENT SERVICES | Facility: HOSPITAL | Age: 73
LOS: 1 days | End: 2023-07-03
Payer: MEDICARE

## 2023-07-03 ENCOUNTER — RESULT REVIEW (OUTPATIENT)
Age: 73
End: 2023-07-03

## 2023-07-03 DIAGNOSIS — R06.02 SHORTNESS OF BREATH: ICD-10-CM

## 2023-07-03 PROCEDURE — 78452 HT MUSCLE IMAGE SPECT MULT: CPT | Mod: 26,1L

## 2023-07-03 PROCEDURE — 93016 CV STRESS TEST SUPVJ ONLY: CPT | Mod: 1L,NC

## 2023-07-03 PROCEDURE — A9500: CPT

## 2023-07-03 PROCEDURE — 93018 CV STRESS TEST I&R ONLY: CPT | Mod: 1L

## 2023-07-03 PROCEDURE — 78452 HT MUSCLE IMAGE SPECT MULT: CPT

## 2023-07-03 PROCEDURE — 93017 CV STRESS TEST TRACING ONLY: CPT

## 2023-08-15 ENCOUNTER — APPOINTMENT (OUTPATIENT)
Dept: PULMONOLOGY | Facility: CLINIC | Age: 73
End: 2023-08-15
Payer: MEDICARE

## 2023-08-15 VITALS
RESPIRATION RATE: 16 BRPM | WEIGHT: 222 LBS | SYSTOLIC BLOOD PRESSURE: 128 MMHG | HEART RATE: 76 BPM | HEIGHT: 63 IN | DIASTOLIC BLOOD PRESSURE: 75 MMHG | TEMPERATURE: 98.1 F | BODY MASS INDEX: 39.34 KG/M2 | OXYGEN SATURATION: 93 %

## 2023-08-15 DIAGNOSIS — Z86.39 PERSONAL HISTORY OF OTHER ENDOCRINE, NUTRITIONAL AND METABOLIC DISEASE: ICD-10-CM

## 2023-08-15 DIAGNOSIS — R06.02 SHORTNESS OF BREATH: ICD-10-CM

## 2023-08-15 DIAGNOSIS — Z86.79 PERSONAL HISTORY OF OTHER DISEASES OF THE CIRCULATORY SYSTEM: ICD-10-CM

## 2023-08-15 DIAGNOSIS — Z78.9 OTHER SPECIFIED HEALTH STATUS: ICD-10-CM

## 2023-08-15 DIAGNOSIS — J98.4 OTHER DISORDERS OF LUNG: ICD-10-CM

## 2023-08-15 PROCEDURE — 99214 OFFICE O/P EST MOD 30 MIN: CPT

## 2023-08-31 ENCOUNTER — APPOINTMENT (OUTPATIENT)
Dept: PULMONOLOGY | Facility: CLINIC | Age: 73
End: 2023-08-31
Payer: MEDICARE

## 2023-08-31 VITALS
RESPIRATION RATE: 14 BRPM | SYSTOLIC BLOOD PRESSURE: 132 MMHG | HEIGHT: 63 IN | OXYGEN SATURATION: 92 % | TEMPERATURE: 98.1 F | HEART RATE: 72 BPM | DIASTOLIC BLOOD PRESSURE: 75 MMHG | WEIGHT: 222 LBS | BODY MASS INDEX: 39.34 KG/M2

## 2023-08-31 PROCEDURE — 99214 OFFICE O/P EST MOD 30 MIN: CPT

## 2023-09-05 ENCOUNTER — APPOINTMENT (OUTPATIENT)
Dept: PULMONOLOGY | Facility: CLINIC | Age: 73
End: 2023-09-05
Payer: MEDICARE

## 2023-09-05 PROCEDURE — 94726 PLETHYSMOGRAPHY LUNG VOLUMES: CPT

## 2023-09-05 PROCEDURE — 94060 EVALUATION OF WHEEZING: CPT

## 2023-09-05 PROCEDURE — 94729 DIFFUSING CAPACITY: CPT

## 2023-09-06 ENCOUNTER — RX RENEWAL (OUTPATIENT)
Age: 73
End: 2023-09-06

## 2023-09-06 ENCOUNTER — NON-APPOINTMENT (OUTPATIENT)
Age: 73
End: 2023-09-06

## 2023-09-06 ENCOUNTER — APPOINTMENT (OUTPATIENT)
Dept: INTERNAL MEDICINE | Facility: CLINIC | Age: 73
End: 2023-09-06
Payer: MEDICARE

## 2023-09-06 VITALS
OXYGEN SATURATION: 90 % | DIASTOLIC BLOOD PRESSURE: 73 MMHG | RESPIRATION RATE: 14 BRPM | HEART RATE: 84 BPM | BODY MASS INDEX: 39.34 KG/M2 | HEIGHT: 63 IN | WEIGHT: 222 LBS | TEMPERATURE: 98 F | SYSTOLIC BLOOD PRESSURE: 116 MMHG

## 2023-09-06 DIAGNOSIS — Z01.818 ENCOUNTER FOR OTHER PREPROCEDURAL EXAMINATION: ICD-10-CM

## 2023-09-06 LAB
ALBUMIN SERPL ELPH-MCNC: 4.5 G/DL
ALP BLD-CCNC: 111 U/L
ALT SERPL-CCNC: 19 U/L
ANION GAP SERPL CALC-SCNC: 10 MMOL/L
AST SERPL-CCNC: 22 U/L
BILIRUB SERPL-MCNC: 0.6 MG/DL
BUN SERPL-MCNC: 19 MG/DL
CALCIUM SERPL-MCNC: 9.9 MG/DL
CHLORIDE SERPL-SCNC: 102 MMOL/L
CO2 SERPL-SCNC: 29 MMOL/L
CREAT SERPL-MCNC: 1 MG/DL
EGFR: 78 ML/MIN/1.73M2
ESTIMATED AVERAGE GLUCOSE: 143 MG/DL
GLUCOSE SERPL-MCNC: 83 MG/DL
HBA1C MFR BLD HPLC: 6.6 %
POTASSIUM SERPL-SCNC: 4.5 MMOL/L
PROT SERPL-MCNC: 6.9 G/DL
SODIUM SERPL-SCNC: 141 MMOL/L

## 2023-09-06 PROCEDURE — 99215 OFFICE O/P EST HI 40 MIN: CPT

## 2023-09-06 PROCEDURE — 93000 ELECTROCARDIOGRAM COMPLETE: CPT | Mod: NC,59

## 2023-09-06 RX ORDER — FLUTICASONE PROPIONATE 50 UG/1
50 SPRAY, METERED NASAL DAILY
Qty: 1 | Refills: 1 | Status: DISCONTINUED | COMMUNITY
Start: 2022-05-10 | End: 2023-09-06

## 2023-09-06 RX ORDER — PROMETHAZINE HYDROCHLORIDE 6.25 MG/5ML
6.25 SOLUTION ORAL
Qty: 140 | Refills: 2 | Status: DISCONTINUED | COMMUNITY
Start: 2022-05-10 | End: 2023-09-06

## 2023-09-06 NOTE — REVIEW OF SYSTEMS
[Fatigue] : fatigue [Dyspnea on Exertion] : dyspnea on exertion [Joint Pain] : joint pain [Joint Stiffness] : joint stiffness [Back Pain] : back pain [Negative] : Heme/Lymph

## 2023-09-06 NOTE — PHYSICAL EXAM
[No Acute Distress] : no acute distress [Normal Sclera/Conjunctiva] : normal sclera/conjunctiva [Normal Outer Ear/Nose] : the outer ears and nose were normal in appearance [Normal Rhythm/Effort] : normal respiratory rhythm and effort [Dry Cough] : a dry cough was heard [Decreased Breath Sounds] : breath sounds were decreased diffusely [Normal to Percussion] : the lungs were normal to percussion [Normal] : palpation of the chest was normal [Soft] : abdomen soft [Non Tender] : non-tender [Normal Bowel Sounds] : normal bowel sounds [Scar] : a scar was noted [Abdomen Hernia Incisional] : an incisional hernia was present [Abdomen Hernia Umbilical] : an umbilical hernia was present [Normal Anterior Cervical Nodes] : no anterior cervical lymphadenopathy [No Rash] : no rash [Coordination Grossly Intact] : coordination grossly intact [Alert and Oriented x3] : oriented to person, place, and time

## 2023-09-06 NOTE — ASSESSMENT
[Patient Optimized for Surgery] : Patient optimized for surgery [No Further Testing Recommended] : no further testing recommended [Continue medications as is] : Continue current medications [As per surgery] : as per surgery [FreeTextEntry4] : 73 Y OLD MALE WITH PMX OF HTN ,IFG ,DYSLIPIDEMIA ,OBESITY ,LUKE AT ACCEPTABLE RISK FOR SURGERY

## 2023-09-06 NOTE — HISTORY OF PRESENT ILLNESS
[No Pertinent Cardiac History] : no history of aortic stenosis, atrial fibrillation, coronary artery disease, recent myocardial infarction, or implantable device/pacemaker [COPD] : COPD [Sleep Apnea] : sleep apnea [No Adverse Anesthesia Reaction] : no adverse anesthesia reaction in self or family member [FreeTextEntry1] : R CTS AND GANGLION CYST  [FreeTextEntry2] : 9/11/23 [FreeTextEntry3] : DR KEVIN HANSEN [FreeTextEntry4] : PT WILL UNDERGO R WRIST SURGERY IN 5 DAYS  YESTERDAY SEEN BY PULM  OFFERS NO NEW COMPLAINS

## 2023-09-08 LAB
APTT BLD: 29.9 SEC
INR PPP: 0.97 RATIO
PT BLD: 11 SEC

## 2023-09-15 LAB
HCT VFR BLD CALC: 40.1 %
HGB BLD-MCNC: 13.3 G/DL
MCHC RBC-ENTMCNC: 30.4 PG
MCHC RBC-ENTMCNC: 33.2 GM/DL
MCV RBC AUTO: 91.8 FL
PLATELET # BLD AUTO: 222 K/UL
RBC # BLD: 4.37 M/UL
RBC # FLD: 12.9 %
WBC # FLD AUTO: 8.17 K/UL

## 2024-01-01 ENCOUNTER — RX RENEWAL (OUTPATIENT)
Age: 74
End: 2024-01-01

## 2024-02-02 ENCOUNTER — RX RENEWAL (OUTPATIENT)
Age: 74
End: 2024-02-02

## 2024-02-02 RX ORDER — LEVOTHYROXINE SODIUM 0.1 MG/1
100 TABLET ORAL
Qty: 90 | Refills: 1 | Status: ACTIVE | COMMUNITY
Start: 2022-03-06 | End: 1900-01-01

## 2024-02-07 ENCOUNTER — APPOINTMENT (OUTPATIENT)
Dept: INTERNAL MEDICINE | Facility: CLINIC | Age: 74
End: 2024-02-07
Payer: MEDICARE

## 2024-02-07 VITALS
BODY MASS INDEX: 38.8 KG/M2 | WEIGHT: 219 LBS | TEMPERATURE: 98.6 F | RESPIRATION RATE: 14 BRPM | OXYGEN SATURATION: 91 % | HEART RATE: 69 BPM | HEIGHT: 63 IN | DIASTOLIC BLOOD PRESSURE: 68 MMHG | SYSTOLIC BLOOD PRESSURE: 135 MMHG

## 2024-02-07 DIAGNOSIS — N40.0 BENIGN PROSTATIC HYPERPLASIA WITHOUT LOWER URINARY TRACT SYMPMS: ICD-10-CM

## 2024-02-07 DIAGNOSIS — G47.33 OBSTRUCTIVE SLEEP APNEA (ADULT) (PEDIATRIC): ICD-10-CM

## 2024-02-07 PROCEDURE — 99214 OFFICE O/P EST MOD 30 MIN: CPT

## 2024-02-08 LAB
ALBUMIN SERPL ELPH-MCNC: 4.8 G/DL
ALP BLD-CCNC: 106 U/L
ALT SERPL-CCNC: 18 U/L
ANION GAP SERPL CALC-SCNC: 11 MMOL/L
AST SERPL-CCNC: 22 U/L
BILIRUB SERPL-MCNC: 0.4 MG/DL
BUN SERPL-MCNC: 24 MG/DL
CALCIUM SERPL-MCNC: 9.7 MG/DL
CHLORIDE SERPL-SCNC: 100 MMOL/L
CHOLEST SERPL-MCNC: 183 MG/DL
CO2 SERPL-SCNC: 28 MMOL/L
CREAT SERPL-MCNC: 1.01 MG/DL
EGFR: 78 ML/MIN/1.73M2
ESTIMATED AVERAGE GLUCOSE: 137 MG/DL
GLUCOSE SERPL-MCNC: 105 MG/DL
HBA1C MFR BLD HPLC: 6.4 %
HDLC SERPL-MCNC: 56 MG/DL
LDLC SERPL CALC-MCNC: 108 MG/DL
NONHDLC SERPL-MCNC: 126 MG/DL
POTASSIUM SERPL-SCNC: 4.8 MMOL/L
PROT SERPL-MCNC: 7.2 G/DL
SODIUM SERPL-SCNC: 139 MMOL/L
TRIGL SERPL-MCNC: 102 MG/DL

## 2024-05-04 ENCOUNTER — RX RENEWAL (OUTPATIENT)
Age: 74
End: 2024-05-04

## 2024-05-22 ENCOUNTER — LABORATORY RESULT (OUTPATIENT)
Age: 74
End: 2024-05-22

## 2024-05-22 ENCOUNTER — NON-APPOINTMENT (OUTPATIENT)
Age: 74
End: 2024-05-22

## 2024-05-22 ENCOUNTER — APPOINTMENT (OUTPATIENT)
Dept: INTERNAL MEDICINE | Facility: CLINIC | Age: 74
End: 2024-05-22
Payer: MEDICARE

## 2024-05-22 VITALS
OXYGEN SATURATION: 99 % | HEART RATE: 84 BPM | DIASTOLIC BLOOD PRESSURE: 70 MMHG | SYSTOLIC BLOOD PRESSURE: 115 MMHG | TEMPERATURE: 97.8 F | RESPIRATION RATE: 15 BRPM | WEIGHT: 221 LBS | BODY MASS INDEX: 39.16 KG/M2 | HEIGHT: 63 IN

## 2024-05-22 DIAGNOSIS — R73.03 PREDIABETES.: ICD-10-CM

## 2024-05-22 DIAGNOSIS — J44.9 CHRONIC OBSTRUCTIVE PULMONARY DISEASE, UNSPECIFIED: ICD-10-CM

## 2024-05-22 DIAGNOSIS — Z00.00 ENCOUNTER FOR GENERAL ADULT MEDICAL EXAMINATION W/OUT ABNORMAL FINDINGS: ICD-10-CM

## 2024-05-22 DIAGNOSIS — I10 ESSENTIAL (PRIMARY) HYPERTENSION: ICD-10-CM

## 2024-05-22 DIAGNOSIS — M67.431 GANGLION, RIGHT WRIST: ICD-10-CM

## 2024-05-22 DIAGNOSIS — E03.9 HYPOTHYROIDISM, UNSPECIFIED: ICD-10-CM

## 2024-05-22 DIAGNOSIS — E66.9 OBESITY, UNSPECIFIED: ICD-10-CM

## 2024-05-22 PROCEDURE — 99213 OFFICE O/P EST LOW 20 MIN: CPT | Mod: 25

## 2024-05-22 PROCEDURE — 93000 ELECTROCARDIOGRAM COMPLETE: CPT

## 2024-05-22 PROCEDURE — 99397 PER PM REEVAL EST PAT 65+ YR: CPT

## 2024-05-22 RX ORDER — ATORVASTATIN CALCIUM 20 MG/1
20 TABLET, FILM COATED ORAL
Qty: 30 | Refills: 3 | Status: ACTIVE | COMMUNITY
Start: 2023-06-12 | End: 1900-01-01

## 2024-05-22 RX ORDER — HYDROCHLOROTHIAZIDE 12.5 MG/1
12.5 CAPSULE ORAL
Qty: 90 | Refills: 0 | Status: ACTIVE | COMMUNITY
Start: 2023-05-16 | End: 1900-01-01

## 2024-05-22 NOTE — REVIEW OF SYSTEMS
[Shortness Of Breath] : shortness of breath [Cough] : cough [Dyspnea on Exertion] : dyspnea on exertion [Joint Pain] : joint pain [Negative] : Heme/Lymph

## 2024-05-22 NOTE — HEALTH RISK ASSESSMENT
[Yes] : Yes [2 - 4 times a month (2 pts)] : 2-4 times a month (2 points) [1 or 2 (0 pts)] : 1 or 2 (0 points) [Never (0 pts)] : Never (0 points) [Little interest or pleasure doing things] : 1) Little interest or pleasure doing things [0] : 1) Little interest or pleasure doing things: Not at all (0) [Feeling down, depressed, or hopeless] : 2) Feeling down, depressed, or hopeless [1] : 2) Feeling down, depressed, or hopeless for several days (1) [PHQ-2 Negative - No further assessment needed] : PHQ-2 Negative - No further assessment needed [Alone] : lives alone [Retired] : retired [Single] : single [Feels Safe at Home] : Feels safe at home [Never] : Never

## 2024-05-22 NOTE — PHYSICAL EXAM
[No Acute Distress] : no acute distress [Normal Sclera/Conjunctiva] : normal sclera/conjunctiva [Normal Outer Ear/Nose] : the outer ears and nose were normal in appearance [Normal Rhythm/Effort] : normal respiratory rhythm and effort [Shallow] : shallow respirations were noted [Decreased Breath Sounds] : breath sounds were decreased diffusely [Prolonged Exp Time] : expiratory time was prolonged [Rhonchi Bilateral Bases] : rhonchi were heard over both bases [Normal to Percussion] : the lungs were normal to percussion [Normal] : palpation of the chest was normal [Soft] : abdomen soft [Non Tender] : non-tender [Normal Bowel Sounds] : normal bowel sounds [Obese] : obese [Coordination Grossly Intact] : coordination grossly intact [No Focal Deficits] : no focal deficits [Alert and Oriented x3] : oriented to person, place, and time [de-identified] : R GANGLION CYST

## 2024-05-23 LAB
25(OH)D3 SERPL-MCNC: 36.1 NG/ML
ALBUMIN SERPL ELPH-MCNC: 4.3 G/DL
ALP BLD-CCNC: 131 U/L
ALT SERPL-CCNC: 20 U/L
ANION GAP SERPL CALC-SCNC: 13 MMOL/L
APPEARANCE: CLEAR
AST SERPL-CCNC: 22 U/L
BILIRUB SERPL-MCNC: 0.4 MG/DL
BILIRUBIN URINE: NEGATIVE
BLOOD URINE: NEGATIVE
BUN SERPL-MCNC: 18 MG/DL
CALCIUM SERPL-MCNC: 9.6 MG/DL
CHLORIDE SERPL-SCNC: 99 MMOL/L
CHOLEST SERPL-MCNC: 104 MG/DL
CO2 SERPL-SCNC: 28 MMOL/L
COLOR: NORMAL
CREAT SERPL-MCNC: 1.03 MG/DL
EGFR: 76 ML/MIN/1.73M2
ESTIMATED AVERAGE GLUCOSE: 137 MG/DL
GLUCOSE QUALITATIVE U: NEGATIVE MG/DL
GLUCOSE SERPL-MCNC: 112 MG/DL
HBA1C MFR BLD HPLC: 6.4 %
HDLC SERPL-MCNC: 43 MG/DL
KETONES URINE: ABNORMAL MG/DL
LDLC SERPL CALC-MCNC: 45 MG/DL
LEUKOCYTE ESTERASE URINE: NEGATIVE
NITRITE URINE: NEGATIVE
NONHDLC SERPL-MCNC: 62 MG/DL
PH URINE: 5.5
POTASSIUM SERPL-SCNC: 3.6 MMOL/L
PROT SERPL-MCNC: 6.9 G/DL
PROTEIN URINE: 30 MG/DL
PSA FREE FLD-MCNC: 27 %
PSA FREE SERPL-MCNC: 1.5 NG/ML
PSA SERPL-MCNC: 5.58 NG/ML
SODIUM SERPL-SCNC: 139 MMOL/L
SPECIFIC GRAVITY URINE: 1.02
TRIGL SERPL-MCNC: 86 MG/DL
TSH SERPL-ACNC: 5.42 UIU/ML
UROBILINOGEN URINE: 1 MG/DL
VIT B12 SERPL-MCNC: 701 PG/ML

## 2024-05-30 LAB
HCT VFR BLD CALC: 37.9 %
HGB BLD-MCNC: 13.1 G/DL
MCHC RBC-ENTMCNC: 31 PG
MCHC RBC-ENTMCNC: 34.6 GM/DL
MCV RBC AUTO: 89.6 FL
PLATELET # BLD AUTO: 267 K/UL
RBC # BLD: 4.23 M/UL
RBC # FLD: 12.9 %
WBC # FLD AUTO: 7.79 K/UL

## 2024-06-29 ENCOUNTER — RX RENEWAL (OUTPATIENT)
Age: 74
End: 2024-06-29

## 2024-09-13 ENCOUNTER — RX RENEWAL (OUTPATIENT)
Age: 74
End: 2024-09-13

## 2024-09-25 ENCOUNTER — RX RENEWAL (OUTPATIENT)
Age: 74
End: 2024-09-25

## 2024-09-28 ENCOUNTER — RX RENEWAL (OUTPATIENT)
Age: 74
End: 2024-09-28

## 2024-10-17 ENCOUNTER — RX RENEWAL (OUTPATIENT)
Age: 74
End: 2024-10-17

## 2024-12-09 ENCOUNTER — APPOINTMENT (OUTPATIENT)
Age: 74
End: 2024-12-09
Payer: COMMERCIAL

## 2024-12-09 VITALS
TEMPERATURE: 98.3 F | HEART RATE: 66 BPM | HEIGHT: 63 IN | SYSTOLIC BLOOD PRESSURE: 126 MMHG | OXYGEN SATURATION: 95 % | RESPIRATION RATE: 14 BRPM | BODY MASS INDEX: 38.8 KG/M2 | WEIGHT: 219 LBS | DIASTOLIC BLOOD PRESSURE: 81 MMHG

## 2024-12-09 DIAGNOSIS — I10 ESSENTIAL (PRIMARY) HYPERTENSION: ICD-10-CM

## 2024-12-09 DIAGNOSIS — E66.9 OBESITY, UNSPECIFIED: ICD-10-CM

## 2024-12-09 DIAGNOSIS — E03.9 HYPOTHYROIDISM, UNSPECIFIED: ICD-10-CM

## 2024-12-09 DIAGNOSIS — R73.03 PREDIABETES.: ICD-10-CM

## 2024-12-09 DIAGNOSIS — N40.0 BENIGN PROSTATIC HYPERPLASIA WITHOUT LOWER URINARY TRACT SYMPMS: ICD-10-CM

## 2024-12-09 PROCEDURE — 99204 OFFICE O/P NEW MOD 45 MIN: CPT

## 2024-12-10 LAB
ALBUMIN SERPL ELPH-MCNC: 4.4 G/DL
ALP BLD-CCNC: 124 U/L
ALT SERPL-CCNC: 13 U/L
ANION GAP SERPL CALC-SCNC: 11 MMOL/L
AST SERPL-CCNC: 20 U/L
BILIRUB SERPL-MCNC: 0.4 MG/DL
BUN SERPL-MCNC: 19 MG/DL
CALCIUM SERPL-MCNC: 9.7 MG/DL
CHLORIDE SERPL-SCNC: 99 MMOL/L
CHOLEST SERPL-MCNC: 135 MG/DL
CO2 SERPL-SCNC: 30 MMOL/L
CREAT SERPL-MCNC: 0.95 MG/DL
EGFR: 83 ML/MIN/1.73M2
ESTIMATED AVERAGE GLUCOSE: 143 MG/DL
GLUCOSE SERPL-MCNC: 99 MG/DL
HBA1C MFR BLD HPLC: 6.6 %
HDLC SERPL-MCNC: 69 MG/DL
LDLC SERPL CALC-MCNC: 50 MG/DL
NONHDLC SERPL-MCNC: 66 MG/DL
POTASSIUM SERPL-SCNC: 4 MMOL/L
PROT SERPL-MCNC: 7.4 G/DL
SODIUM SERPL-SCNC: 140 MMOL/L
TRIGL SERPL-MCNC: 78 MG/DL

## 2025-02-07 ENCOUNTER — RX RENEWAL (OUTPATIENT)
Age: 75
End: 2025-02-07

## 2025-03-24 ENCOUNTER — APPOINTMENT (OUTPATIENT)
Age: 75
End: 2025-03-24
Payer: MEDICARE

## 2025-03-24 ENCOUNTER — NON-APPOINTMENT (OUTPATIENT)
Age: 75
End: 2025-03-24

## 2025-03-24 ENCOUNTER — RX RENEWAL (OUTPATIENT)
Age: 75
End: 2025-03-24

## 2025-03-24 VITALS — SYSTOLIC BLOOD PRESSURE: 100 MMHG | DIASTOLIC BLOOD PRESSURE: 65 MMHG

## 2025-03-24 VITALS
OXYGEN SATURATION: 94 % | RESPIRATION RATE: 14 BRPM | BODY MASS INDEX: 39.87 KG/M2 | HEART RATE: 79 BPM | TEMPERATURE: 98 F | WEIGHT: 225 LBS | HEIGHT: 63 IN

## 2025-03-24 DIAGNOSIS — G47.33 OBSTRUCTIVE SLEEP APNEA (ADULT) (PEDIATRIC): ICD-10-CM

## 2025-03-24 DIAGNOSIS — R73.03 PREDIABETES.: ICD-10-CM

## 2025-03-24 DIAGNOSIS — E11.65 TYPE 2 DIABETES MELLITUS WITH HYPERGLYCEMIA: ICD-10-CM

## 2025-03-24 DIAGNOSIS — I10 ESSENTIAL (PRIMARY) HYPERTENSION: ICD-10-CM

## 2025-03-24 DIAGNOSIS — E66.9 OBESITY, UNSPECIFIED: ICD-10-CM

## 2025-03-24 DIAGNOSIS — E03.9 HYPOTHYROIDISM, UNSPECIFIED: ICD-10-CM

## 2025-03-24 PROCEDURE — 99214 OFFICE O/P EST MOD 30 MIN: CPT

## 2025-03-24 RX ORDER — ORAL SEMAGLUTIDE 3 MG/1
3 TABLET ORAL
Qty: 60 | Refills: 0 | Status: ACTIVE | COMMUNITY
Start: 2025-03-24 | End: 1900-01-01

## 2025-03-25 LAB
ALBUMIN SERPL ELPH-MCNC: 4.4 G/DL
ALP BLD-CCNC: 141 U/L
ALT SERPL-CCNC: 19 U/L
ANION GAP SERPL CALC-SCNC: 11 MMOL/L
AST SERPL-CCNC: 20 U/L
BILIRUB SERPL-MCNC: 0.2 MG/DL
BUN SERPL-MCNC: 19 MG/DL
CALCIUM SERPL-MCNC: 9.5 MG/DL
CHLORIDE SERPL-SCNC: 100 MMOL/L
CHOLEST SERPL-MCNC: 123 MG/DL
CO2 SERPL-SCNC: 31 MMOL/L
CREAT SERPL-MCNC: 0.89 MG/DL
EGFRCR SERPLBLD CKD-EPI 2021: 89 ML/MIN/1.73M2
GLUCOSE SERPL-MCNC: 186 MG/DL
HDLC SERPL-MCNC: 52 MG/DL
LDLC SERPL-MCNC: 48 MG/DL
NONHDLC SERPL-MCNC: 71 MG/DL
POTASSIUM SERPL-SCNC: 4.1 MMOL/L
PROT SERPL-MCNC: 7.1 G/DL
SODIUM SERPL-SCNC: 141 MMOL/L
TRIGL SERPL-MCNC: 131 MG/DL

## 2025-04-02 LAB
ESTIMATED AVERAGE GLUCOSE: 163 MG/DL
HBA1C MFR BLD HPLC: 7.3 %

## 2025-06-02 ENCOUNTER — RX RENEWAL (OUTPATIENT)
Age: 75
End: 2025-06-02

## 2025-07-28 ENCOUNTER — LABORATORY RESULT (OUTPATIENT)
Age: 75
End: 2025-07-28

## 2025-07-28 ENCOUNTER — APPOINTMENT (OUTPATIENT)
Age: 75
End: 2025-07-28
Payer: MEDICARE

## 2025-07-28 VITALS
SYSTOLIC BLOOD PRESSURE: 128 MMHG | HEIGHT: 63 IN | WEIGHT: 220 LBS | TEMPERATURE: 97.1 F | OXYGEN SATURATION: 92 % | RESPIRATION RATE: 14 BRPM | DIASTOLIC BLOOD PRESSURE: 73 MMHG | HEART RATE: 80 BPM | BODY MASS INDEX: 38.98 KG/M2

## 2025-07-28 DIAGNOSIS — R73.03 PREDIABETES.: ICD-10-CM

## 2025-07-28 DIAGNOSIS — Z00.00 ENCOUNTER FOR GENERAL ADULT MEDICAL EXAMINATION W/OUT ABNORMAL FINDINGS: ICD-10-CM

## 2025-07-28 DIAGNOSIS — N40.0 BENIGN PROSTATIC HYPERPLASIA WITHOUT LOWER URINARY TRACT SYMPMS: ICD-10-CM

## 2025-07-28 DIAGNOSIS — Z01.818 ENCOUNTER FOR OTHER PREPROCEDURAL EXAMINATION: ICD-10-CM

## 2025-07-28 DIAGNOSIS — E66.9 OBESITY, UNSPECIFIED: ICD-10-CM

## 2025-07-28 DIAGNOSIS — E11.65 TYPE 2 DIABETES MELLITUS WITH HYPERGLYCEMIA: ICD-10-CM

## 2025-07-28 DIAGNOSIS — R25.2 CRAMP AND SPASM: ICD-10-CM

## 2025-07-28 DIAGNOSIS — E03.9 HYPOTHYROIDISM, UNSPECIFIED: ICD-10-CM

## 2025-07-28 DIAGNOSIS — I10 ESSENTIAL (PRIMARY) HYPERTENSION: ICD-10-CM

## 2025-07-28 PROCEDURE — G0439: CPT

## 2025-07-28 PROCEDURE — 93000 ELECTROCARDIOGRAM COMPLETE: CPT

## 2025-07-28 RX ORDER — MAGNESIUM OXIDE 241.3 MG/1000MG
400 TABLET ORAL
Qty: 90 | Refills: 3 | Status: ACTIVE | COMMUNITY
Start: 2025-07-28 | End: 1900-01-01

## 2025-07-29 LAB
25(OH)D3 SERPL-MCNC: 38.8 NG/ML
ALBUMIN SERPL ELPH-MCNC: 4.3 G/DL
ALBUMIN, RANDOM URINE: 8.9 MG/DL
ALP BLD-CCNC: 118 U/L
ALT SERPL-CCNC: 24 U/L
ANION GAP SERPL CALC-SCNC: 15 MMOL/L
APPEARANCE: CLEAR
AST SERPL-CCNC: 25 U/L
BILIRUB SERPL-MCNC: 0.6 MG/DL
BILIRUBIN URINE: NEGATIVE
BLOOD URINE: NEGATIVE
BUN SERPL-MCNC: 16 MG/DL
CALCIUM SERPL-MCNC: 9.8 MG/DL
CHLORIDE SERPL-SCNC: 99 MMOL/L
CHOLEST SERPL-MCNC: 108 MG/DL
CO2 SERPL-SCNC: 26 MMOL/L
COLOR: YELLOW
CREAT SERPL-MCNC: 1.1 MG/DL
CREAT SPEC-SCNC: 153 MG/DL
EGFRCR SERPLBLD CKD-EPI 2021: 69 ML/MIN/1.73M2
GLUCOSE QUALITATIVE U: NEGATIVE MG/DL
GLUCOSE SERPL-MCNC: 164 MG/DL
HBV SURFACE AG SER QL: NONREACTIVE
HCV AB SER QL: NONREACTIVE
HCV S/CO RATIO: 0.09 S/CO
HDLC SERPL-MCNC: 45 MG/DL
KETONES URINE: ABNORMAL MG/DL
LDLC SERPL-MCNC: 42 MG/DL
LEUKOCYTE ESTERASE URINE: ABNORMAL
MICROALBUMIN/CREAT 24H UR-RTO: 58 MG/G
NITRITE URINE: NEGATIVE
NONHDLC SERPL-MCNC: 63 MG/DL
PH URINE: 6
POTASSIUM SERPL-SCNC: 4.1 MMOL/L
PROT SERPL-MCNC: 7 G/DL
PROTEIN URINE: 30 MG/DL
PSA FREE FLD-MCNC: 29 %
PSA FREE SERPL-MCNC: 1.42 NG/ML
PSA SERPL-MCNC: 4.96 NG/ML
SODIUM SERPL-SCNC: 140 MMOL/L
SPECIFIC GRAVITY URINE: 1.02
TRIGL SERPL-MCNC: 111 MG/DL
TSH SERPL-ACNC: 6.31 UIU/ML
UROBILINOGEN URINE: 1 MG/DL
VIT B12 SERPL-MCNC: 708 PG/ML

## 2025-08-13 LAB — HIV1+2 AB SPEC QL IA.RAPID: NONREACTIVE
